# Patient Record
Sex: MALE | Race: WHITE | Employment: OTHER | URBAN - NONMETROPOLITAN AREA
[De-identification: names, ages, dates, MRNs, and addresses within clinical notes are randomized per-mention and may not be internally consistent; named-entity substitution may affect disease eponyms.]

---

## 2019-12-18 ENCOUNTER — OFFICE VISIT (OUTPATIENT)
Dept: CARDIOLOGY CLINIC | Age: 74
End: 2019-12-18
Payer: MEDICARE

## 2019-12-18 VITALS
BODY MASS INDEX: 32.95 KG/M2 | HEIGHT: 73 IN | DIASTOLIC BLOOD PRESSURE: 82 MMHG | SYSTOLIC BLOOD PRESSURE: 166 MMHG | HEART RATE: 84 BPM | WEIGHT: 248.6 LBS

## 2019-12-18 DIAGNOSIS — R06.00 DYSPNEA, UNSPECIFIED TYPE: ICD-10-CM

## 2019-12-18 DIAGNOSIS — I10 HYPERTENSION, UNSPECIFIED TYPE: Primary | ICD-10-CM

## 2019-12-18 DIAGNOSIS — R93.1 AGATSTON CORONARY ARTERY CALCIUM SCORE GREATER THAN 400: ICD-10-CM

## 2019-12-18 PROCEDURE — G8417 CALC BMI ABV UP PARAM F/U: HCPCS | Performed by: INTERNAL MEDICINE

## 2019-12-18 PROCEDURE — G8427 DOCREV CUR MEDS BY ELIG CLIN: HCPCS | Performed by: INTERNAL MEDICINE

## 2019-12-18 PROCEDURE — 4040F PNEUMOC VAC/ADMIN/RCVD: CPT | Performed by: INTERNAL MEDICINE

## 2019-12-18 PROCEDURE — 3017F COLORECTAL CA SCREEN DOC REV: CPT | Performed by: INTERNAL MEDICINE

## 2019-12-18 PROCEDURE — 1036F TOBACCO NON-USER: CPT | Performed by: INTERNAL MEDICINE

## 2019-12-18 PROCEDURE — 1123F ACP DISCUSS/DSCN MKR DOCD: CPT | Performed by: INTERNAL MEDICINE

## 2019-12-18 PROCEDURE — 99204 OFFICE O/P NEW MOD 45 MIN: CPT | Performed by: INTERNAL MEDICINE

## 2019-12-18 PROCEDURE — 93000 ELECTROCARDIOGRAM COMPLETE: CPT | Performed by: INTERNAL MEDICINE

## 2019-12-18 PROCEDURE — G8484 FLU IMMUNIZE NO ADMIN: HCPCS | Performed by: INTERNAL MEDICINE

## 2019-12-18 RX ORDER — ROSUVASTATIN CALCIUM 20 MG/1
20 TABLET, COATED ORAL DAILY
Qty: 30 TABLET | Refills: 3 | Status: SHIPPED | OUTPATIENT
Start: 2019-12-18 | End: 2020-06-25

## 2019-12-18 RX ORDER — MULTIVITAMIN WITH IRON
250 TABLET ORAL DAILY
COMMUNITY

## 2019-12-18 RX ORDER — METOPROLOL SUCCINATE 25 MG/1
25 TABLET, EXTENDED RELEASE ORAL DAILY
Qty: 30 TABLET | Refills: 3 | Status: SHIPPED | OUTPATIENT
Start: 2019-12-18 | End: 2020-05-20 | Stop reason: SDUPTHER

## 2020-01-22 ENCOUNTER — HOSPITAL ENCOUNTER (OUTPATIENT)
Dept: NON INVASIVE DIAGNOSTICS | Age: 75
Discharge: HOME | End: 2020-01-22
Payer: MEDICARE

## 2020-01-22 VITALS — WEIGHT: 249.5 LBS | HEIGHT: 73 IN | BODY MASS INDEX: 33.07 KG/M2

## 2020-01-22 LAB
LV EF: 63 %
LVEF MODALITY: NORMAL

## 2020-01-22 PROCEDURE — 3430000000 HC RX DIAGNOSTIC RADIOPHARMACEUTICAL: Performed by: INTERNAL MEDICINE

## 2020-01-22 PROCEDURE — A9500 TC99M SESTAMIBI: HCPCS | Performed by: INTERNAL MEDICINE

## 2020-01-22 PROCEDURE — 2709999900 HC NON-CHARGEABLE SUPPLY

## 2020-01-22 PROCEDURE — 78452 HT MUSCLE IMAGE SPECT MULT: CPT

## 2020-01-22 PROCEDURE — 93017 CV STRESS TEST TRACING ONLY: CPT | Performed by: INTERNAL MEDICINE

## 2020-01-22 RX ADMIN — Medication 10 MILLICURIE: at 07:50

## 2020-01-22 RX ADMIN — Medication 32.4 MILLICURIE: at 09:10

## 2020-01-24 ENCOUNTER — TELEPHONE (OUTPATIENT)
Dept: CARDIOLOGY CLINIC | Age: 75
End: 2020-01-24

## 2020-01-28 ENCOUNTER — TELEPHONE (OUTPATIENT)
Dept: CARDIOLOGY CLINIC | Age: 75
End: 2020-01-28

## 2020-02-05 ENCOUNTER — PREP FOR PROCEDURE (OUTPATIENT)
Dept: CARDIOLOGY CLINIC | Age: 75
End: 2020-02-05

## 2020-02-05 RX ORDER — SODIUM CHLORIDE 0.9 % (FLUSH) 0.9 %
10 SYRINGE (ML) INJECTION EVERY 12 HOURS SCHEDULED
Status: CANCELLED | OUTPATIENT
Start: 2020-02-05

## 2020-02-05 RX ORDER — SODIUM CHLORIDE 9 MG/ML
INJECTION, SOLUTION INTRAVENOUS CONTINUOUS
Status: CANCELLED | OUTPATIENT
Start: 2020-02-05

## 2020-02-05 RX ORDER — SODIUM CHLORIDE 0.9 % (FLUSH) 0.9 %
10 SYRINGE (ML) INJECTION PRN
Status: CANCELLED | OUTPATIENT
Start: 2020-02-05

## 2020-02-05 RX ORDER — DIPHENHYDRAMINE HYDROCHLORIDE 50 MG/ML
50 INJECTION INTRAMUSCULAR; INTRAVENOUS ONCE
Status: CANCELLED | OUTPATIENT
Start: 2020-02-05 | End: 2020-02-05

## 2020-02-05 RX ORDER — NITROGLYCERIN 0.4 MG/1
0.4 TABLET SUBLINGUAL EVERY 5 MIN PRN
Status: CANCELLED | OUTPATIENT
Start: 2020-02-05

## 2020-02-05 RX ORDER — ASPIRIN 325 MG
325 TABLET ORAL ONCE
Status: CANCELLED | OUTPATIENT
Start: 2020-02-05 | End: 2020-02-05

## 2020-02-06 ENCOUNTER — HOSPITAL ENCOUNTER (OUTPATIENT)
Dept: INPATIENT UNIT | Age: 75
Discharge: HOME | End: 2020-02-07
Attending: INTERNAL MEDICINE | Admitting: INTERNAL MEDICINE
Payer: MEDICARE

## 2020-02-06 PROBLEM — I25.10 CAD IN NATIVE ARTERY: Status: ACTIVE | Noted: 2020-02-06

## 2020-02-06 PROBLEM — Z98.61 CAD S/P PERCUTANEOUS CORONARY ANGIOPLASTY: Status: ACTIVE | Noted: 2020-02-06

## 2020-02-06 PROBLEM — I25.10 CAD S/P PERCUTANEOUS CORONARY ANGIOPLASTY: Status: ACTIVE | Noted: 2020-02-06

## 2020-02-06 LAB
ABO: NORMAL
ACTIVATED CLOTTING TIME: 296 SECONDS (ref 1–150)
ANION GAP SERPL CALCULATED.3IONS-SCNC: 15 MEQ/L (ref 8–16)
ANTIBODY SCREEN: NORMAL
APTT: 31 SECONDS (ref 22–38)
BUN BLDV-MCNC: 14 MG/DL (ref 7–22)
CALCIUM SERPL-MCNC: 9.8 MG/DL (ref 8.5–10.5)
CHLORIDE BLD-SCNC: 102 MEQ/L (ref 98–111)
CO2: 23 MEQ/L (ref 23–33)
CREAT SERPL-MCNC: 1 MG/DL (ref 0.4–1.2)
EKG ATRIAL RATE: 80 BPM
EKG P AXIS: 20 DEGREES
EKG P-R INTERVAL: 154 MS
EKG Q-T INTERVAL: 362 MS
EKG QRS DURATION: 90 MS
EKG QTC CALCULATION (BAZETT): 417 MS
EKG R AXIS: 58 DEGREES
EKG T AXIS: 34 DEGREES
EKG VENTRICULAR RATE: 80 BPM
ERYTHROCYTE [DISTWIDTH] IN BLOOD BY AUTOMATED COUNT: 12.6 % (ref 11.5–14.5)
ERYTHROCYTE [DISTWIDTH] IN BLOOD BY AUTOMATED COUNT: 45.1 FL (ref 35–45)
GFR SERPL CREATININE-BSD FRML MDRD: 73 ML/MIN/1.73M2
GLUCOSE BLD-MCNC: 153 MG/DL (ref 70–108)
HCT VFR BLD CALC: 50.2 % (ref 42–52)
HEMOGLOBIN: 17 GM/DL (ref 14–18)
INR BLD: 1.02 (ref 0.85–1.13)
MCH RBC QN AUTO: 32.9 PG (ref 26–33)
MCHC RBC AUTO-ENTMCNC: 33.9 GM/DL (ref 32.2–35.5)
MCV RBC AUTO: 97.1 FL (ref 80–94)
PLATELET # BLD: 221 THOU/MM3 (ref 130–400)
PMV BLD AUTO: 10.1 FL (ref 9.4–12.4)
POTASSIUM REFLEX MAGNESIUM: 4.1 MEQ/L (ref 3.5–5.2)
RBC # BLD: 5.17 MILL/MM3 (ref 4.7–6.1)
RH FACTOR: NORMAL
SODIUM BLD-SCNC: 140 MEQ/L (ref 135–145)
WBC # BLD: 8.8 THOU/MM3 (ref 4.8–10.8)

## 2020-02-06 PROCEDURE — 92978 ENDOLUMINL IVUS OCT C 1ST: CPT | Performed by: INTERNAL MEDICINE

## 2020-02-06 PROCEDURE — 2580000003 HC RX 258: Performed by: NURSE PRACTITIONER

## 2020-02-06 PROCEDURE — C9600 PERC DRUG-EL COR STENT SING: HCPCS | Performed by: INTERNAL MEDICINE

## 2020-02-06 PROCEDURE — 85347 COAGULATION TIME ACTIVATED: CPT

## 2020-02-06 PROCEDURE — 92929 PR PRQ TRLUML CORONARY STENT W/ANGIO ADDL ART/BRNCH: CPT | Performed by: INTERNAL MEDICINE

## 2020-02-06 PROCEDURE — 80048 BASIC METABOLIC PNL TOTAL CA: CPT

## 2020-02-06 PROCEDURE — C1725 CATH, TRANSLUMIN NON-LASER: HCPCS

## 2020-02-06 PROCEDURE — 93571 IV DOP VEL&/PRESS C FLO 1ST: CPT | Performed by: INTERNAL MEDICINE

## 2020-02-06 PROCEDURE — 2720000010 HC SURG SUPPLY STERILE

## 2020-02-06 PROCEDURE — 86900 BLOOD TYPING SEROLOGIC ABO: CPT

## 2020-02-06 PROCEDURE — 93454 CORONARY ARTERY ANGIO S&I: CPT | Performed by: INTERNAL MEDICINE

## 2020-02-06 PROCEDURE — 2500000003 HC RX 250 WO HCPCS

## 2020-02-06 PROCEDURE — 6370000000 HC RX 637 (ALT 250 FOR IP)

## 2020-02-06 PROCEDURE — C1769 GUIDE WIRE: HCPCS

## 2020-02-06 PROCEDURE — 85027 COMPLETE CBC AUTOMATED: CPT

## 2020-02-06 PROCEDURE — 86850 RBC ANTIBODY SCREEN: CPT

## 2020-02-06 PROCEDURE — 2580000003 HC RX 258: Performed by: INTERNAL MEDICINE

## 2020-02-06 PROCEDURE — 6360000004 HC RX CONTRAST MEDICATION: Performed by: INTERNAL MEDICINE

## 2020-02-06 PROCEDURE — 85610 PROTHROMBIN TIME: CPT

## 2020-02-06 PROCEDURE — 6370000000 HC RX 637 (ALT 250 FOR IP): Performed by: INTERNAL MEDICINE

## 2020-02-06 PROCEDURE — 92928 PRQ TCAT PLMT NTRAC ST 1 LES: CPT | Performed by: INTERNAL MEDICINE

## 2020-02-06 PROCEDURE — 36415 COLL VENOUS BLD VENIPUNCTURE: CPT

## 2020-02-06 PROCEDURE — 93005 ELECTROCARDIOGRAM TRACING: CPT | Performed by: NURSE PRACTITIONER

## 2020-02-06 PROCEDURE — C1894 INTRO/SHEATH, NON-LASER: HCPCS

## 2020-02-06 PROCEDURE — 6360000002 HC RX W HCPCS

## 2020-02-06 PROCEDURE — C1874 STENT, COATED/COV W/DEL SYS: HCPCS

## 2020-02-06 PROCEDURE — 2709999900 HC NON-CHARGEABLE SUPPLY

## 2020-02-06 PROCEDURE — 85730 THROMBOPLASTIN TIME PARTIAL: CPT

## 2020-02-06 PROCEDURE — 86901 BLOOD TYPING SEROLOGIC RH(D): CPT

## 2020-02-06 PROCEDURE — C9601 PERC DRUG-EL COR STENT BRAN: HCPCS | Performed by: INTERNAL MEDICINE

## 2020-02-06 PROCEDURE — 94761 N-INVAS EAR/PLS OXIMETRY MLT: CPT

## 2020-02-06 PROCEDURE — C1887 CATHETER, GUIDING: HCPCS

## 2020-02-06 RX ORDER — ACETAMINOPHEN 325 MG/1
650 TABLET ORAL EVERY 4 HOURS PRN
Status: DISCONTINUED | OUTPATIENT
Start: 2020-02-06 | End: 2020-02-07 | Stop reason: HOSPADM

## 2020-02-06 RX ORDER — ASPIRIN 81 MG/1
81 TABLET, CHEWABLE ORAL DAILY
Status: DISCONTINUED | OUTPATIENT
Start: 2020-02-07 | End: 2020-02-06 | Stop reason: SDUPTHER

## 2020-02-06 RX ORDER — DIPHENHYDRAMINE HYDROCHLORIDE 50 MG/ML
50 INJECTION INTRAMUSCULAR; INTRAVENOUS ONCE
Status: DISCONTINUED | OUTPATIENT
Start: 2020-02-06 | End: 2020-02-07 | Stop reason: HOSPADM

## 2020-02-06 RX ORDER — SODIUM CHLORIDE 0.9 % (FLUSH) 0.9 %
10 SYRINGE (ML) INJECTION PRN
Status: DISCONTINUED | OUTPATIENT
Start: 2020-02-06 | End: 2020-02-06 | Stop reason: SDUPTHER

## 2020-02-06 RX ORDER — METOPROLOL SUCCINATE 25 MG/1
25 TABLET, EXTENDED RELEASE ORAL DAILY
Status: DISCONTINUED | OUTPATIENT
Start: 2020-02-07 | End: 2020-02-07 | Stop reason: HOSPADM

## 2020-02-06 RX ORDER — NITROGLYCERIN 0.4 MG/1
0.4 TABLET SUBLINGUAL EVERY 5 MIN PRN
Status: DISCONTINUED | OUTPATIENT
Start: 2020-02-06 | End: 2020-02-07 | Stop reason: HOSPADM

## 2020-02-06 RX ORDER — MULTIVITAMIN/IRON/FOLIC ACID 18MG-0.4MG
250 TABLET ORAL DAILY
Status: DISCONTINUED | OUTPATIENT
Start: 2020-02-06 | End: 2020-02-07 | Stop reason: HOSPADM

## 2020-02-06 RX ORDER — SODIUM CHLORIDE 0.9 % (FLUSH) 0.9 %
10 SYRINGE (ML) INJECTION PRN
Status: DISCONTINUED | OUTPATIENT
Start: 2020-02-06 | End: 2020-02-07 | Stop reason: HOSPADM

## 2020-02-06 RX ORDER — ASPIRIN 81 MG/1
81 TABLET ORAL DAILY
Status: DISCONTINUED | OUTPATIENT
Start: 2020-02-07 | End: 2020-02-07 | Stop reason: HOSPADM

## 2020-02-06 RX ORDER — ASCORBIC ACID 500 MG
500 TABLET ORAL DAILY
Status: DISCONTINUED | OUTPATIENT
Start: 2020-02-06 | End: 2020-02-07 | Stop reason: HOSPADM

## 2020-02-06 RX ORDER — ROSUVASTATIN CALCIUM 20 MG/1
20 TABLET, COATED ORAL DAILY
Status: DISCONTINUED | OUTPATIENT
Start: 2020-02-06 | End: 2020-02-07 | Stop reason: HOSPADM

## 2020-02-06 RX ORDER — ASPIRIN 325 MG
325 TABLET ORAL ONCE
Status: DISCONTINUED | OUTPATIENT
Start: 2020-02-06 | End: 2020-02-06 | Stop reason: SDUPTHER

## 2020-02-06 RX ORDER — SODIUM CHLORIDE 9 MG/ML
INJECTION, SOLUTION INTRAVENOUS CONTINUOUS
Status: DISCONTINUED | OUTPATIENT
Start: 2020-02-06 | End: 2020-02-07 | Stop reason: HOSPADM

## 2020-02-06 RX ORDER — SODIUM CHLORIDE 0.9 % (FLUSH) 0.9 %
10 SYRINGE (ML) INJECTION EVERY 12 HOURS SCHEDULED
Status: DISCONTINUED | OUTPATIENT
Start: 2020-02-06 | End: 2020-02-06 | Stop reason: SDUPTHER

## 2020-02-06 RX ORDER — SODIUM CHLORIDE 0.9 % (FLUSH) 0.9 %
10 SYRINGE (ML) INJECTION EVERY 12 HOURS SCHEDULED
Status: DISCONTINUED | OUTPATIENT
Start: 2020-02-06 | End: 2020-02-07 | Stop reason: HOSPADM

## 2020-02-06 RX ADMIN — ROSUVASTATIN CALCIUM 20 MG: 20 TABLET, COATED ORAL at 20:54

## 2020-02-06 RX ADMIN — SODIUM CHLORIDE: 9 INJECTION, SOLUTION INTRAVENOUS at 08:39

## 2020-02-06 RX ADMIN — TICAGRELOR 90 MG: 90 TABLET ORAL at 20:58

## 2020-02-06 RX ADMIN — IOPAMIDOL 200 ML: 755 INJECTION, SOLUTION INTRAVENOUS at 12:15

## 2020-02-06 RX ADMIN — SODIUM CHLORIDE, PRESERVATIVE FREE 10 ML: 5 INJECTION INTRAVENOUS at 20:58

## 2020-02-06 ASSESSMENT — PAIN SCALES - GENERAL: PAINLEVEL_OUTOF10: 0

## 2020-02-06 NOTE — FLOWSHEET NOTE
Pt admitted to  2E15 ambulatory for cardiac cath. Pt NPO. Patient accompanied by wife. Vital signs obtained. Assessment and data collection initiated. Oriented to room. Policies and procedures for 2E explained   All questions answered with no further questions at this time. Fall prevention and safety precautions discussed with patient.

## 2020-02-06 NOTE — BRIEF OP NOTE
[address] [address]  Sedation/Analgesia Post Sedation Record    Pt Name: Oumou Garay  Account number: [de-identified]  MRN: [id number]  YOB: 1945  Procedure Performed By: Berry Dougherty, [address]  Primary Care Physician: Alonso Ovalles APRN - CNP  Date: 2/6/2020    POST-PROCEDURE    Physicians/Assistants: RAFFY Costello MD    Procedure Performed:Cath/ PCI      Sedation/Anesthesia: Versed/ Fentanyl and 2% xylocaine local anesthesia. Estimated Blood Loss: < 50 ml. Specimens Removed: None         Disposition of Specimen: N/A        Complications: No Immediate Complications.        Post-procedure Diagnosis/Findings:      Bifurcation LAD-Dx PCI  DAPT              RAFFY Costello MD  Electronically signed 2/6/2020 at 12:04 PM  Interventional Cardiology

## 2020-02-06 NOTE — PRE SEDATION
[address] [address]  Sedation/Analgesia History & Physical    Pt Name: Penney Saint  Account number: [de-identified]  MRN: [id number]  YOB: 1945  Provider Performing Procedure: Mady Jacques MD  Referring Provider: Mady Jacques MD   Primary Care Physician: BEAR Whalen - CNP  Date: 2/6/2020    PRE-PROCEDURE    Code Status: FULL CODE  Brief History/Pre-Procedure Diagnosis:   + stress  KEY  Elevated calcium score     Consent: : I have discussed with the patient risks, benefits, and alternatives (and relevant risks, benefits, and side effects related to alternatives or not receiving care), and likelihood of the success. The patient and/or representative appear to understand and agree to proceed. The discussion encompasses risks, benefits, and side effects related to the alternatives and the risks related to not receiving the proposed care, treatment, and services. The indication, risks and benefits of the procedure and possible therapeutic consequences and alternatives were discussed with the patient. The patient was given the opportunity to ask questions and to have them answered to his/her satisfaction. Risks of the procedure include but are not limited to the following: Bleeding, hematoma including retroperitoneal hemmorhage, infection, pain and discomfort, injury to the aorta and other blood vessels, rhythm disturbance, low blood pressure, myocardial infarction, stroke, kidney damage/failure, myocardial perforation, allergic reactions to sedatives/contrast material, loss of pulse/vascular compromise requiring surgery, aneurysm/pseudoaneurysm formation, possible loss of a limb/hand/leg, needing blood transfusion, requiring emergent open heart surgery or emergent coronary intervention, the need for intubation/respiratory support, the requirement for defibrillation/cardioversion, and death. Alternatives to and omission of the suggested procedure were discussed.  The patient had no further questions and wished to proceed; the consent form was signed. MEDICAL HISTORY  [x]ASHD/ANGINA/MI/CHF   [x]Hypertension  []Diabetes  [x]Hyperlipidemia  []Smoking  []Family Hx of ASHD  [x]Additional information:       has a past medical history of Cancer ([facility] [address] History of kidney stones, Hyperlipidemia, and Hypertension. SURGICAL HISTORY   has no past surgical history on file. Additional information:       ALLERGIES   Allergies as of 02/06/2020    (No Known Allergies)     Additional information:       MEDICATIONS   Aspirin  [x] 81 mg  [] 325 mg  [] None  Antiplatelet drug therapy use last 5 days  [x]No []Yes  Coumadin Use Last 5 Days [x]No []Yes  Other anticoagulant use last 5 days  [x]No []Yes    Current Facility-Administered Medications:     0.9 % sodium chloride infusion, , Intravenous, Continuous, Lazarus Knoll, APRN - CNP, Last Rate: 75 mL/hr at 02/06/20 7955    aspirin tablet 325 mg, 325 mg, Oral, Once, Lazarus Knoll, APRN - CNP    diphenhydrAMINE (BENADRYL) injection 50 mg, 50 mg, Intravenous, Once, Lazarus Knoll, APRN - CNP    famotidine (PEPCID) injection 20 mg, 20 mg, Intravenous, Once, Lazarus Knoll, APRN - CNP    hydrocortisone sodium succinate PF (SOLU-CORTEF) injection 200 mg, 200 mg, Intravenous, Once, Lazarus Knoll, APRN - CNP    nitroGLYCERIN (NITROSTAT) SL tablet 0.4 mg, 0.4 mg, Sublingual, Q5 Min PRN, Lazarus Knoll, APRN - CNP    sodium chloride flush 0.9 % injection 10 mL, 10 mL, Intravenous, 2 times per day, Lazarus Knoll, APRN - CNP    sodium chloride flush 0.9 % injection 10 mL, 10 mL, Intravenous, PRN, Lazarus Knoll, APRN - CNP  Prior to Admission medications    Medication Sig Start Date End Date Taking?  Authorizing Provider   Multiple Vitamins-Minerals (PRESERVISION AREDS 2 PO) Take 1 tablet by mouth daily    Yes Historical Provider, MD   magnesium (MAGNESIUM-OXIDE) 250 MG TABS tablet Take 250 mg by mouth daily   Yes Historical Provider, MD metoprolol succinate (TOPROL XL) 25 MG extended release tablet Take 1 tablet by mouth daily 12/18/19  Yes Marguerite Boss MD   rosuvastatin (CRESTOR) 20 MG tablet Take 1 tablet by mouth daily 12/18/19  Yes Marguerite Boss MD   lisinopril (PRINIVIL;ZESTRIL) 40 MG tablet New increased dose one daily 6/25/15  Yes Chris Valle MD   KRILL OIL PO Take  by mouth daily. Yes Historical Provider, MD   Cholecalciferol (VITAMIN D) 2000 UNITS CAPS capsule Take  by mouth daily. Yes Historical Provider, MD   ascorbic acid (VITAMIN C) 500 MG tablet Take 500 mg by mouth daily. Yes Historical Provider, MD   Coenzyme Q10 (CO Q 10 PO) Take  by mouth daily. Yes Historical Provider, MD   aspirin EC 81 MG EC tablet Take 81 mg by mouth daily. Yes Historical Provider, MD   UNABLE TO FIND Neuro B6 daily   Yes Historical Provider, MD   UNABLE TO FIND Super Beta Prostate daily   Yes Historical Provider, MD     Additional information:       VITAL SIGNS   There were no vitals filed for this visit. PHYSICAL:   General: No acute distress  HEENT:  Unremarkable for age  Neck: without increased JVD, carotid pulses 2+ bilaterally without bruits  Heart: RRR, S1 & S2 WNL, S4 gallop, without murmurs or rubs    Lungs: Clear to auscultation    Abdomen: BS present, without HSM, masses, or tenderness    Extremities: without C,C,E.  Pulses 2+ bilaterally  Mental Status: Alert & Oriented        PLANNED PROCEDURE   [x]Cath  [x]PCI                []Pacemaker/AICD  []CARLITA             []Cardioversion []Peripheral angiography/PTA  []Other:      SEDATION  Planned agent:[x]Midazolam []Meperidine [x]Sublimaze []Morphine  []Diazepam  []Other:     ASA Classification:  []1 []2 [x]3 []4 []5  Class 1: A normal healthy patient  Class 2: Pt with mild to moderate systemic disease  Class 3: Severe systemic disease or disturbance  Class 4: Severe systemic disorders that are already life threatening.   Class 5: Moribund pt with little chances of survival, for

## 2020-02-07 VITALS
HEART RATE: 77 BPM | RESPIRATION RATE: 16 BRPM | SYSTOLIC BLOOD PRESSURE: 121 MMHG | HEIGHT: 73 IN | WEIGHT: 251.7 LBS | TEMPERATURE: 98.1 F | DIASTOLIC BLOOD PRESSURE: 77 MMHG | OXYGEN SATURATION: 93 % | BODY MASS INDEX: 33.36 KG/M2

## 2020-02-07 PROCEDURE — 2580000003 HC RX 258: Performed by: INTERNAL MEDICINE

## 2020-02-07 PROCEDURE — 99238 HOSP IP/OBS DSCHRG MGMT 30/<: CPT | Performed by: NURSE PRACTITIONER

## 2020-02-07 PROCEDURE — 6370000000 HC RX 637 (ALT 250 FOR IP): Performed by: INTERNAL MEDICINE

## 2020-02-07 PROCEDURE — 96361 HYDRATE IV INFUSION ADD-ON: CPT

## 2020-02-07 RX ORDER — LISINOPRIL 40 MG/1
40 TABLET ORAL DAILY
Status: DISCONTINUED | OUTPATIENT
Start: 2020-02-07 | End: 2020-02-07 | Stop reason: HOSPADM

## 2020-02-07 RX ORDER — NITROGLYCERIN 0.4 MG/1
TABLET SUBLINGUAL
Qty: 25 TABLET | Refills: 3 | Status: SHIPPED | OUTPATIENT
Start: 2020-02-07 | End: 2021-06-25 | Stop reason: SDUPTHER

## 2020-02-07 RX ADMIN — TICAGRELOR 90 MG: 90 TABLET ORAL at 08:32

## 2020-02-07 RX ADMIN — SODIUM CHLORIDE, PRESERVATIVE FREE 10 ML: 5 INJECTION INTRAVENOUS at 08:30

## 2020-02-07 RX ADMIN — METOPROLOL SUCCINATE 25 MG: 25 TABLET, EXTENDED RELEASE ORAL at 08:33

## 2020-02-07 RX ADMIN — ROSUVASTATIN CALCIUM 20 MG: 20 TABLET, COATED ORAL at 08:34

## 2020-02-07 RX ADMIN — ASPIRIN 81 MG: 81 TABLET ORAL at 08:32

## 2020-02-07 ASSESSMENT — PAIN SCALES - GENERAL: PAINLEVEL_OUTOF10: 0

## 2020-02-07 NOTE — DISCHARGE SUMMARY
Cardiology Discharge Summary      Patient Identification:  Demarco Friend  :   MRN: [id number]   Account: [de-identified]     Admit date: 2020  Discharge date: 20     Attending provider: Dave Pearson MD        Primary care provider: BEAR Mauricio CNP     Admission Diagnoses:  Dyspnea on exertion  Premature Family CAD/Death  Agaston calcium score 735  Abnormal stress test  HTN  HLD      Discharge Diagnoses:   Dyspnea on exertion  Premature Family CAD/Death  Agaston calcium score 735  Abnormal stress test  HTN  HLD  LHC with PCI / BHARGAV of LAD bifurcation     Hospital Course:   Demarco Friend is a 75 y.o. male with the above past medical history admitted to [address] [place] [address] on 2020 for LHC by Dr. Benard Schilder to further evaluate coronary artery anatomy in light of dyspnea on exertion, abnormal calcium score, and abnormal stress test. LHC revealed LAD bifurcation obstruction necessitating PCI / BHARGAV of area. He convalesced over night without adverse events and on 20 was hemodynamically stable and agreeable to discharge home. At the time of discharge he was pain free,  tolerating food and fluids, ambulating without difficulty or complaints of. Right radial cath site soft without pain, bleeding, drainage, redness, or warmth. Right upper extremitie with normal color / temperature, evident movement / sensation, and pulses present. The patient has been educated on symptoms of heart disease that include chest pain, passing out, dizziness, etc. And to report them if there is any change or go to the emergency room. He will follow up in cardiology clinic in 1-2 weeks.      Procedures: LHC with PCI / BHARGAV of LAD bifurcation    Code Status: Full Code     Consults:   none    Examination:  Vitals:/77   Pulse 77   Temp 98.1 °F (36.7 °C) (Oral)   Resp 16   Ht 6' 1\" (1.854 m)   Wt 251 lb 11.2 oz (114.2 kg)   SpO2 93%   BMI 33.21 kg/m² 24 hour intake/output:    Intake/Output Summary (Last 24 hours) at 2/7/2020 0957  Last data filed at 2/7/2020 0341  Gross per 24 hour   Intake 150 ml   Output 0 ml   Net 150 ml       General Appearance: alert and oriented to person, place and time, in no acute distress  Cardiovascular: normal rate, regular rhythm, normal S1 and S2, no murmurs, rubs, clicks, or gallops, distal pulses intact, no carotid bruits, no JVD  Pulmonary/Chest: clear to auscultation bilaterally- no wheezes, rales or rhonchi, normal air movement, no respiratory distress  Abdomen: soft, non-tender, non-distended, normal bowel sounds, no masses Extremities: no cyanosis, clubbing or edema, pulse   Right radial cath site: soft without pain, bleeding, drainage, redness, or warmth. Extremity with normal color / temperature, evident movement / sensation, and pulses present. Skin: warm and dry, no rash or erythema  Head: normocephalic and atraumatic  Eyes: pupils equal, round, and reactive to light  Neck: supple and non-tender without mass, no thyromegaly   Musculoskeletal: normal range of motion, no joint swelling, deformity or tenderness  Neurological: alert, oriented, normal speech, no focal findings or movement disorder noted    Medications:  Current Discharge Medication List      START taking these medications    Details   ticagrelor (BRILINTA) 90 MG TABS tablet Take 1 tablet by mouth 2 times daily  Qty: 180 tablet, Refills: 3      nitroGLYCERIN (NITROSTAT) 0.4 MG SL tablet up to max of 3 total doses. If no relief after 1 dose, call 911.   Qty: 25 tablet, Refills: 3         CONTINUE these medications which have NOT CHANGED    Details   Multiple Vitamins-Minerals (PRESERVISION AREDS 2 PO) Take 1 tablet by mouth daily       magnesium (MAGNESIUM-OXIDE) 250 MG TABS tablet Take 250 mg by mouth daily      metoprolol succinate (TOPROL XL) 25 MG extended release tablet Take 1 tablet by mouth daily  Qty: 30 tablet, Refills: 3      rosuvastatin

## 2020-02-10 NOTE — PROCEDURES
OM1  without any significant obstruction. There is a large dominant OM2, has  no significant obstruction. The AV groove branch has had moderate  luminal irregularities with no significant obstruction. INTERVENTION:  Given the findings and presentation, we elected to  proceed with FFR of the LAD. I exchanged out for a 6-[place]ij[place] EBU 3.75  guiding catheter. Heparin IV was given. ACT was confirmed to be above  250 seconds. I then appropriately zeroed and equalized the FFR wire per   recommendations. I passed the FFR wire down into the  distal LAD. IC nitroglycerin was given. IV adenosine was infused. After 3 minutes of IV adenosine, the FFR value was 0.84. At this point  the only residual major lesion was in the diagonal branch. We took the  FFR wire back, re-wired the diagonal branch for intervention. I then  passed 2.5 x 23 Xience [facility] and was going to deploy this at the ostium of  the diagonal branch to the mid lesion. I did wire the LAD with a  Runthrough wire for protection just in case there was severe narrowing. After this was done, I deployed the stent in the diagonal branch. I  postdilated the stent up to 12 atmospheres. Once this was done, I  wanted to check the LAD to ensure that there was no plaque shift into the LAD  that would result in significant stenosis or ischemia. Given these  findings, I pulled the wire out of the diagonal branch where I put the  FFR wire back into the LAD. Since, it was never removed from the body,  IV adenosine was then re-infused. After about a minute to 2 minutes of  IV adenosine infusion, the FFR value was 0.75. Clearly, there was  plaque shift into the LAD and that would cause significant ischemia, but  in an effort to save the diagonal branch, we would have to ensure that  the LAD was appropriately patent. I removed the wire from the diagonal  branch.  I got a 3.0 x 8 NC balloon and dilated the mid LAD, crossing the  stent in the

## 2020-02-12 ENCOUNTER — HOSPITAL ENCOUNTER (OUTPATIENT)
Dept: NON INVASIVE DIAGNOSTICS | Age: 75
Discharge: HOME | End: 2020-02-12
Payer: MEDICARE

## 2020-02-12 LAB
LV EF: 63 %
LVEF MODALITY: NORMAL

## 2020-02-12 PROCEDURE — 93306 TTE W/DOPPLER COMPLETE: CPT

## 2020-02-20 ENCOUNTER — OFFICE VISIT (OUTPATIENT)
Dept: CARDIOLOGY CLINIC | Age: 75
End: 2020-02-20
Payer: MEDICARE

## 2020-02-20 VITALS
DIASTOLIC BLOOD PRESSURE: 82 MMHG | HEIGHT: 73 IN | WEIGHT: 252.4 LBS | SYSTOLIC BLOOD PRESSURE: 150 MMHG | HEART RATE: 69 BPM | BODY MASS INDEX: 33.45 KG/M2

## 2020-02-20 PROCEDURE — G8417 CALC BMI ABV UP PARAM F/U: HCPCS | Performed by: NURSE PRACTITIONER

## 2020-02-20 PROCEDURE — G8484 FLU IMMUNIZE NO ADMIN: HCPCS | Performed by: NURSE PRACTITIONER

## 2020-02-20 PROCEDURE — 3017F COLORECTAL CA SCREEN DOC REV: CPT | Performed by: NURSE PRACTITIONER

## 2020-02-20 PROCEDURE — 4040F PNEUMOC VAC/ADMIN/RCVD: CPT | Performed by: NURSE PRACTITIONER

## 2020-02-20 PROCEDURE — 99213 OFFICE O/P EST LOW 20 MIN: CPT | Performed by: NURSE PRACTITIONER

## 2020-02-20 PROCEDURE — 1036F TOBACCO NON-USER: CPT | Performed by: NURSE PRACTITIONER

## 2020-02-20 PROCEDURE — 1123F ACP DISCUSS/DSCN MKR DOCD: CPT | Performed by: NURSE PRACTITIONER

## 2020-02-20 PROCEDURE — 93000 ELECTROCARDIOGRAM COMPLETE: CPT | Performed by: NURSE PRACTITIONER

## 2020-02-20 PROCEDURE — G8427 DOCREV CUR MEDS BY ELIG CLIN: HCPCS | Performed by: NURSE PRACTITIONER

## 2020-02-20 NOTE — PROGRESS NOTES
[facility] PROF[facility]  [facility]   [address] [facility] [address]   Dept: [phone]   Dept Fax: [phone]: [phone]      Chief Complaint   Patient presents with    Follow-Up from Hospital     s/p cath, with stents     Follow up from [facility] with OCT guidance PCI/BHARGAV of LAD-diagonal bifurcation. Denies chest pain, palpitations, sob, THIERNO, lightheadedness, dizziness or syncope. No bleeding issues with DAPT. EF 60. B/P in office elevated, B/P at home has been 120-130 / 70's, he states he has white coat syndrome and this always happens at Dr mederos. Cardiologist:  Dr. Larios Hence:   No fever, no chills, No fatigue or weight loss  Pulmonary:    No dyspnea, no wheezing  Cardiac:    Denies recent chest pain   GI:     No nausea or vomiting, no abdominal pain  Neuro:    No dizziness or light headedness  Musculoskeletal:  No recent active issues  Extremities:   No edema, good peripheral pulses      Past Medical History:   Diagnosis Date    Cancer ([address]     skin cancer    History of kidney stones     Hyperlipidemia     Hypertension        No Known Allergies    Current Outpatient Medications   Medication Sig Dispense Refill    ticagrelor (BRILINTA) 90 MG TABS tablet Take 1 tablet by mouth 2 times daily 180 tablet 3    nitroGLYCERIN (NITROSTAT) 0.4 MG SL tablet up to max of 3 total doses. If no relief after 1 dose, call 911. 25 tablet 3    Multiple Vitamins-Minerals (PRESERVISION AREDS 2 PO) Take 1 tablet by mouth daily       magnesium (MAGNESIUM-OXIDE) 250 MG TABS tablet Take 250 mg by mouth daily      metoprolol succinate (TOPROL XL) 25 MG extended release tablet Take 1 tablet by mouth daily 30 tablet 3    rosuvastatin (CRESTOR) 20 MG tablet Take 1 tablet by mouth daily 30 tablet 3    lisinopril (PRINIVIL;ZESTRIL) 40 MG tablet New increased dose one daily 90 tablet 3    KRILL OIL PO Take  by mouth daily.       Cholecalciferol (VITAMIN D) 2000 UNITS CAPS capsule Take  by mouth daily.  ascorbic acid (VITAMIN C) 500 MG tablet Take 500 mg by mouth daily.  Coenzyme Q10 (CO Q 10 PO) Take  by mouth daily.  aspirin EC 81 MG EC tablet Take 81 mg by mouth daily.  UNABLE TO FIND Neuro B6 daily      UNABLE TO FIND Super Beta Prostate daily       No current facility-administered medications for this visit. Social History     Socioeconomic History    Marital status:      Spouse name: None    Number of children: None    Years of education: None    Highest education level: None   Occupational History    None   Social Needs    Financial resource strain: None    Food insecurity:     Worry: None     Inability: None    Transportation needs:     Medical: None     Non-medical: None   Tobacco Use    Smoking status: Former Smoker     Types: Cigarettes     Last attempt to quit:      Years since quittin.1    Smokeless tobacco: Never Used   Substance and Sexual Activity    Alcohol use: Yes     Alcohol/week: 0.0 standard drinks     Comment: socially    Drug use: No    Sexual activity: Yes   Lifestyle    Physical activity:     Days per week: None     Minutes per session: None    Stress: None   Relationships    Social connections:     Talks on phone: None     Gets together: None     Attends Mandaeism service: None     Active member of club or organization: None     Attends meetings of clubs or organizations: None     Relationship status: None    Intimate partner violence:     Fear of current or ex partner: None     Emotionally abused: None     Physically abused: None     Forced sexual activity: None   Other Topics Concern    None   Social History Narrative    None       Family History   Problem Relation Age of Onset    Diabetes Mother     Heart Disease Father     Heart Disease Brother        Blood pressure (!) 150/82, pulse 69, height 6' 1\" (1.854 m), weight 252 lb 6.4 oz (114.5 kg).     General:   Well developed, well nourished  Lungs:   Clear to auscultation  Heart:    Normal S1 S2, No murmur, rubs, or gallops  Abdomen:   Soft, non tender, no organomegalies, positive bowel sounds  Extremities:   No edema, no cyanosis, good peripheral pulses  Neurological:   Awake, alert, oriented. No obvious focal deficits  Musculoskeletal:  No obvious deformities    EKG: SR, no acute abnormalities    Echo: 2/12/20  Summary   Ejection fraction was estimated at 60-65%. Ascending aorta = 3.0 cm. IVC size is within normal limits with normal respiratory phasic changes. Signature      ----------------------------------------------------------------   Electronically signed by Rody Camp MD     LHC/PCI: 2/6/20  CORONARY ANGIOGRAM:     LEFT MAIN:  Patent without any significant obstruction.     LAD:  He has [id number] 50% to 60% stenosis, and about 40% to 50%  stenosis in the mid LAD and takeoff of large diagonal branch. Diagonal  has ostial 80% to 90% stenosis followed by another 70% to 80% stenosis  proximally. The diagonal branch was large, was at least 2.5 mm in  diameter.     RCA:  Moderate luminal irregularities throughout with maximum stenosis  of about 30% to 40% in the mid segment, bifurcates into the PDA and PLV  with mild luminal irregularities throughout. RCA is dominant.     LCX:  Mild luminal irregularities throughout. Gives rise to small OM1  without any significant obstruction. There is a large dominant OM2, has  no significant obstruction. The AV groove branch has had moderate  luminal irregularities with no significant obstruction.     INTERVENTION:  Given the findings and presentation, we elected to  proceed with FFR of the LAD. I exchanged out for a 6-[place] EBU 3.75  guiding catheter. Heparin IV was given. ACT was confirmed to be above  250 seconds. I then appropriately zeroed and equalized the FFR wire per   recommendations. I passed the FFR wire down into the  distal LAD.   IC

## 2020-04-02 ENCOUNTER — HOSPITAL ENCOUNTER (OUTPATIENT)
Dept: CARDIAC REHAB | Age: 75
Discharge: HOME | End: 2020-04-02

## 2020-05-21 RX ORDER — METOPROLOL SUCCINATE 25 MG/1
25 TABLET, EXTENDED RELEASE ORAL DAILY
Qty: 90 TABLET | Refills: 3 | Status: SHIPPED | OUTPATIENT
Start: 2020-05-21 | End: 2021-08-09

## 2020-06-25 ENCOUNTER — OFFICE VISIT (OUTPATIENT)
Dept: CARDIOLOGY CLINIC | Age: 75
End: 2020-06-25
Payer: MEDICARE

## 2020-06-25 VITALS
HEART RATE: 74 BPM | WEIGHT: 238.2 LBS | HEIGHT: 73 IN | SYSTOLIC BLOOD PRESSURE: 140 MMHG | BODY MASS INDEX: 31.57 KG/M2 | DIASTOLIC BLOOD PRESSURE: 84 MMHG

## 2020-06-25 PROCEDURE — 1123F ACP DISCUSS/DSCN MKR DOCD: CPT | Performed by: INTERNAL MEDICINE

## 2020-06-25 PROCEDURE — 3017F COLORECTAL CA SCREEN DOC REV: CPT | Performed by: INTERNAL MEDICINE

## 2020-06-25 PROCEDURE — 99214 OFFICE O/P EST MOD 30 MIN: CPT | Performed by: INTERNAL MEDICINE

## 2020-06-25 PROCEDURE — 1036F TOBACCO NON-USER: CPT | Performed by: INTERNAL MEDICINE

## 2020-06-25 PROCEDURE — 4040F PNEUMOC VAC/ADMIN/RCVD: CPT | Performed by: INTERNAL MEDICINE

## 2020-06-25 PROCEDURE — G8417 CALC BMI ABV UP PARAM F/U: HCPCS | Performed by: INTERNAL MEDICINE

## 2020-06-25 PROCEDURE — G8427 DOCREV CUR MEDS BY ELIG CLIN: HCPCS | Performed by: INTERNAL MEDICINE

## 2020-06-25 RX ORDER — ICOSAPENT ETHYL 1000 MG/1
2 CAPSULE ORAL 2 TIMES DAILY
Qty: 60 CAPSULE | Refills: 3 | Status: SHIPPED | OUTPATIENT
Start: 2020-06-25 | End: 2021-07-20

## 2020-06-25 RX ORDER — ROSUVASTATIN CALCIUM 40 MG/1
40 TABLET, COATED ORAL DAILY
Qty: 60 TABLET | Refills: 3 | Status: SHIPPED | OUTPATIENT
Start: 2020-06-25

## 2020-06-25 NOTE — PROGRESS NOTES
[facility] [address] [place] [address]  Dept: [phone]  Dept Fax: [phone]  Loc: [phone]    Visit Date: 6/25/2020    Mr. Motta is a 75 y.o. male  who presented for:  Chief Complaint   Patient presents with    Follow-up       HPI:   HPI   76 yo M hx of LAD-Dx PCI 2/2020, EF 60-65% who presents for follow-up. DAPT without bleeding. No NTG SL prn.  BP is mildly elevated, he says at home 118/64. No chest pain, angina, KEY, orthopnea, PND, sob at rest, palpitations, LE edema, or syncope. No issues with ADLs. Current Outpatient Medications:     metoprolol succinate (TOPROL XL) 25 MG extended release tablet, Take 1 tablet by mouth daily, Disp: 90 tablet, Rfl: 3    ticagrelor (BRILINTA) 90 MG TABS tablet, Take 1 tablet by mouth 2 times daily, Disp: 180 tablet, Rfl: 3    nitroGLYCERIN (NITROSTAT) 0.4 MG SL tablet, up to max of 3 total doses. If no relief after 1 dose, call 911., Disp: 25 tablet, Rfl: 3    Multiple Vitamins-Minerals (PRESERVISION AREDS 2 PO), Take 1 tablet by mouth daily , Disp: , Rfl:     magnesium (MAGNESIUM-OXIDE) 250 MG TABS tablet, Take 250 mg by mouth daily, Disp: , Rfl:     rosuvastatin (CRESTOR) 20 MG tablet, Take 1 tablet by mouth daily, Disp: 30 tablet, Rfl: 3    lisinopril (PRINIVIL;ZESTRIL) 40 MG tablet, New increased dose one daily, Disp: 90 tablet, Rfl: 3    KRILL OIL PO, Take  by mouth daily. , Disp: , Rfl:     Cholecalciferol (VITAMIN D) 2000 UNITS CAPS capsule, Take  by mouth daily. , Disp: , Rfl:     ascorbic acid (VITAMIN C) 500 MG tablet, Take 500 mg by mouth daily. , Disp: , Rfl:     Coenzyme Q10 (CO Q 10 PO), Take  by mouth daily. , Disp: , Rfl:     aspirin EC 81 MG EC tablet, Take 81 mg by mouth daily. , Disp: , Rfl:     UNABLE TO FIND, Neuro B6 daily, Disp: , Rfl:     UNABLE TO FIND, Super Beta Prostate daily, Disp: , Rfl:     Past Medical History  Shawn Pontiff  has a [facility] history [facility] History of kidney stones, Hyperlipidemia, and Hypertension. Social History  Alecia Orellana  reports that he quit smoking about 25 years ago. His smoking use included cigarettes. He has never used smokeless tobacco. He reports current alcohol use. He reports that he does not use drugs. Family History  Alecia Orellana family history includes Diabetes in his mother; Heart Disease in his brother and father. There is no family history of bicuspid aortic valve, aneurysms, heart transplant, pacemakers, defibrillators, or sudden cardiac death. Past Surgical History   History reviewed. No pertinent surgical history. Review of Systems   Constitutional: Negative for chills and fever  HENT: Negative for congestion, sinus pressure, sneezing and sore throat. Eyes: Negative for pain, discharge, redness and itching. Respiratory: Negative for apnea, cough  Gastrointestinal: Negative for blood in stool, constipation, diarrhea   Endocrine: Negative for cold intolerance, heat intolerance, polydipsia. Genitourinary: Negative for dysuria, enuresis, flank pain and hematuria. Musculoskeletal: Negative for arthralgias, joint swelling and neck pain. Neurological: Negative for numbness and headaches. Psychiatric/Behavioral: Negative for agitation, confusion, decreased concentration and dysphoric mood. Objective:     BP (!) 140/84   Pulse 74   Ht 6' 1\" (1.854 m)   Wt 238 lb 3.2 oz (108 kg)   BMI 31.43 kg/m²     Wt Readings from Last 3 Encounters:   06/25/20 238 lb 3.2 oz (108 kg)   02/20/20 252 lb 6.4 oz (114.5 kg)   02/07/20 251 lb 11.2 oz (114.2 kg)     BP Readings from Last 3 Encounters:   06/25/20 (!) 140/84   02/20/20 (!) 150/82   02/07/20 121/77       Nursing note and vitals reviewed. Physical Exam   Constitutional: Oriented to person, place, and time. Appears well-developed and well-nourished. HENT:   Head: Normocephalic and atraumatic.    Eyes: EOM are normal. Pupils are equal,                                      Ethnicity      Account #       [de-identified]      Room Number      Accession       [id number]      Date of Study         02/12/2020   Number      Date of Birth   1945     Referring Physician   Talha Campbell CNP      Age             75 year(s)     Bre Moulton MD                                  Physician     Procedure    Type of Study      TTE procedure:ECHOCARDIOGRAM COMPLETE 2D W DOPPLER W COLOR. Procedure Date  Date: 02/12/2020 Start: 09:54 AM    Study Location: Echo Lab  Technical Quality: Limited visualization due to poor acoustical window. Indications:S/P percutaneous stent placement. Additional Medical History:Sleep apnea, shortness of breath, CAD with recent  stents, abnormal stress, family history of CAD    Patient Status: Routine    Height: 73 inches Weight: 251.01 pounds BSA: 2.37 m^2 BMI: 33.12 kg/m^2    BP: 121/77 mmHg    Allergies    - No Known Allergies. Conclusions      Summary   Ejection fraction was estimated at 60-65%. Ascending aorta = 3.0 cm. IVC size is within normal limits with normal respiratory phasic changes. Signature      ----------------------------------------------------------------   Electronically signed by Dalton Jauregui MD (Interpreting   physician) on 02/13/2020 at 03:24 PM   ----------------------------------------------------------------      Findings      Mitral Valve   The mitral valve structure was normal with normal leaflet separation. DOPPLER: The transmitral velocity was within the normal range with no   evidence for mitral stenosis. There was no evidence of mitral   regurgitation. Aortic Valve   The aortic valve was trileaflet with normal thickness and cuspal   separation.  DOPPLER: Transaortic velocity was within the normal range with   no evidence of aortic stenosis. There was no evidence of aortic   regurgitation. Tricuspid Valve   The tricuspid valve structure was normal with normal leaflet separation. DOPPLER: There was no evidence of tricuspid stenosis. There was no   evidence of tricuspid regurgitation. Pulmonic Valve   The pulmonic valve leaflets were not well seen. DOPPLER: The transpulmonic   velocity was within the normal range with no evidence for regurgitation. Left Atrium   Left atrial size was normal.      Left Ventricle   Normal left ventricular size and systolic function. There were no regional wall motion abnormalities. Wall thickness was within normal limits. Ejection fraction was estimated at 60-65%. E/A flow reversal noted. Suggestive of diastolic dysfunction. Right Atrium   Right atrial size was normal.      Right Ventricle   The right ventricular size was normal with normal systolic function and   wall thickness. Pericardial Effusion   The pericardium was normal in appearance with no evidence of a pericardial   effusion. Pleural Effusion   No evidence of pleural effusion. Aorta / Great Vessels   -Ascending aorta = 3.0 cm. -IVC size is within normal limits with normal respiratory phasic changes.      M-Mode/2D Measurements & Calculations      LV Diastolic   LV Systolic Dimension:    AV Cusp Separation: 1.8 cmLA   Dimension: 4.4 3.1 cm                    Dimension: 3.8 cmAO Root   cm             LV Volume Diastolic: 90.7 Dimension: 3.3 cmLA Area: 17.7   LV FS:29.6 %   ml                        cm^2   LV PW          LV Volume Systolic: 68.7   Diastolic: 1   ml   cm             LV EDV/LV EDV Index: 87.7   Septum         ml/37 m^2LV ESV/LV ESV    RV Diastolic Dimension: 2.3 cm   Diastolic: 0.7 Index: 19.4 ml/16 m^2   cm             EF Calculated: 56.8 %     LA/Aorta: 1.15                                               LA volume/Index: 47.9 ml /20m^2     Doppler Measurements & Calculations      MV Peak E-Wave: 55.7 cm/s  AV Peak Velocity: 127 LVOT Peak Velocity: 111   MV Peak A-Wave: 94.7 cm/s  cm/s                  cm/s   MV E/A Ratio: 0.59         AV Peak Gradient:     LVOT Peak Gradient: 5   MV Peak Gradient: 1.24     6.45 mmHg             mmHg   mmHg                                                    TV Peak E-Wave: 47.1 cm/s   MV Deceleration Time: 271                        TV Peak A-Wave: 39.8 cm/s   msec                              IVRT: 102 msec        TV Peak Gradient: 0.89                                                    mmHg   MV E' Septal Velocity: 6.2                       TR Velocity:224 cm/s   cm/s                       AV DVI (Vmax):0.87    TR Gradient:20.07 mmHg   MV A' Septal Velocity:                           PV Peak Velocity: 94.7   14.6 cm/s                                        cm/s   MV E' Lateral Velocity:                          PV Peak Gradient: 3.59   9.7 cm/s                                         mmHg   MV A' Lateral Velocity:   13.6 cm/s   E/E' septal: 8.98   E/E' lateral: 5.74     [id number]        Assessment/Plan   LAD-Dx PCI 2/2020  EF 60-65%, NYHA I  HTN  HLD  , LDL 78. Increase Crestor to 40 mg q day, add Vascepa 2 gm BID. FL{P 3-6 months. Needs for elevated TG and CV risk reduction. BP is reasonable at home. No NTG SL prn. DAPT without bleeding. Discussed diet/exercise/BP/weight loss/health lifestyle choices/lipids; the patient understands the goals and will try to comply.         Disposition:  1 year         Electronically signed by Kareem Simmons MD   6/25/2020 at 9:04 AM EDT

## 2020-10-15 NOTE — TELEPHONE ENCOUNTER
Samy Lroenzana called requesting a refill on the following medications:  Requested Prescriptions     Pending Prescriptions Disp Refills    ticagrelor (BRILINTA) 90 MG TABS tablet 180 tablet 3     Sig: Take 1 tablet by mouth 2 times daily     Pharmacy verified:[email or web address]      Date of last visit: 6/25/20  Date of next visit (if applicable): Visit date not found

## 2020-12-17 ENCOUNTER — TELEPHONE (OUTPATIENT)
Dept: CARDIOLOGY CLINIC | Age: 75
End: 2020-12-17

## 2020-12-17 NOTE — TELEPHONE ENCOUNTER
Pre op Risk Assessment    Procedure :colonoscopy   Physician NIKO alas   Date of surgery/procedure 01/07/2021    Last OV 06/25/2020   Last Stress 01/22/2020  Last Echo 02/12/2020  Last Cath 02/06/2020  Last Stent 02/06/2020  Is patient on blood thinners Brilinta 90 mg bid   Aspirin EC 81 mg daily   Hold Meds/how many days : Hold brilinta for 5 days prior to colonoscopy  Please see attached form, fill out and fax to [phone]

## 2020-12-21 NOTE — TELEPHONE ENCOUNTER
May not stop dapt   He has two months till he is 1 year complete  Is this absolute necessary at this time?     If so then he can hold Brilinta for 5 days and restart afterwards although the risk is low for stent thrombosis its not zero    Thanks

## 2021-04-21 LAB
BILIRUBIN URINE: NORMAL
BLOOD, URINE: NEGATIVE
CLARITY: NORMAL
COLOR: YELLOW
GLUCOSE URINE: NEGATIVE
KETONES, URINE: NEGATIVE
LEUKOCYTE ESTERASE, URINE: NEGATIVE
NITRITE, URINE: NEGATIVE
PH UA: 6 (ref 4.5–8)
PROTEIN UA: NEGATIVE
SPECIFIC GRAVITY UA: 1.02 (ref 1–1.03)
UROBILINOGEN, URINE: NORMAL

## 2021-05-08 LAB
ALBUMIN SERPL-MCNC: 4.8 G/DL
ALP BLD-CCNC: 84 U/L
ALT SERPL-CCNC: 30 U/L
ANION GAP SERPL CALCULATED.3IONS-SCNC: NORMAL MMOL/L
AST SERPL-CCNC: 29 U/L
AVERAGE GLUCOSE: 137
BASOPHILS ABSOLUTE: NORMAL
BASOPHILS RELATIVE PERCENT: 0.7 %
BILIRUB SERPL-MCNC: 0.8 MG/DL (ref 0.1–1.4)
BILIRUBIN DIRECT: 0.2 MG/DL
BUN BLDV-MCNC: 15 MG/DL
CALCIUM SERPL-MCNC: 10.3 MG/DL
CHLORIDE BLD-SCNC: 100 MMOL/L
CHOLESTEROL, TOTAL: 166 MG/DL
CHOLESTEROL/HDL RATIO: NORMAL
CO2: 28 MMOL/L
CREAT SERPL-MCNC: 1 MG/DL
EOSINOPHILS ABSOLUTE: 0.4 /ΜL
EOSINOPHILS RELATIVE PERCENT: 5.4 %
FOLATE: NORMAL
GFR CALCULATED: NORMAL
GLUCOSE BLD-MCNC: 114 MG/DL
HBA1C MFR BLD: 6.4 %
HCT VFR BLD CALC: 49.3 % (ref 41–53)
HDLC SERPL-MCNC: 45 MG/DL (ref 35–70)
HEMOGLOBIN: 16.5 G/DL (ref 13.5–17.5)
IRON: 150
LDL CHOLESTEROL CALCULATED: 63 MG/DL (ref 0–160)
LYMPHOCYTES ABSOLUTE: 2.2 /ΜL
LYMPHOCYTES RELATIVE PERCENT: 33 %
MCH RBC QN AUTO: 32.3 PG
MCHC RBC AUTO-ENTMCNC: 33.6 G/DL
MCV RBC AUTO: 96.2 FL
MONOCYTES ABSOLUTE: 0.7 /ΜL
MONOCYTES RELATIVE PERCENT: 11.1 %
NEUTROPHILS ABSOLUTE: 3.3 /ΜL
NEUTROPHILS RELATIVE PERCENT: 49.8 %
NONHDLC SERPL-MCNC: NORMAL MG/DL
PDW BLD-RTO: 13.5 %
PHOSPHORUS: 3.6 MG/DL
PLATELET # BLD: 235 K/ΜL
PMV BLD AUTO: 8.8 FL
POTASSIUM SERPL-SCNC: 4.4 MMOL/L
PROSTATE SPECIFIC ANTIGEN: 0.69 NG/ML
RBC # BLD: 5.12 10^6/ΜL
SODIUM BLD-SCNC: 137 MMOL/L
TOTAL PROTEIN: 7.3
TRIGL SERPL-MCNC: 290 MG/DL
URIC ACID: 5.9
VITAMIN B-12: 196
VITAMIN D 25-HYDROXY: 39
VITAMIN D2, 25 HYDROXY: NORMAL
VITAMIN D3,25 HYDROXY: NORMAL
VLDLC SERPL CALC-MCNC: 58 MG/DL
WBC # BLD: 6.7 10^3/ML

## 2021-06-03 ENCOUNTER — HOSPITAL ENCOUNTER (OUTPATIENT)
Age: 76
Discharge: HOME | End: 2021-06-03
Payer: MEDICARE

## 2021-06-03 ENCOUNTER — HOSPITAL ENCOUNTER (OUTPATIENT)
Dept: GENERAL RADIOLOGY | Age: 76
Discharge: HOME | End: 2021-06-03
Payer: MEDICARE

## 2021-06-03 ENCOUNTER — OFFICE VISIT (OUTPATIENT)
Dept: UROLOGY | Age: 76
End: 2021-06-03
Payer: MEDICARE

## 2021-06-03 VITALS — HEIGHT: 73 IN | WEIGHT: 242.4 LBS | RESPIRATION RATE: 15 BRPM | BODY MASS INDEX: 32.13 KG/M2

## 2021-06-03 DIAGNOSIS — N40.1 BPH WITH OBSTRUCTION/LOWER URINARY TRACT SYMPTOMS: ICD-10-CM

## 2021-06-03 DIAGNOSIS — R39.12 WEAK URINARY STREAM: ICD-10-CM

## 2021-06-03 DIAGNOSIS — N20.0 NEPHROLITHIASIS: ICD-10-CM

## 2021-06-03 DIAGNOSIS — R35.0 URINARY FREQUENCY: ICD-10-CM

## 2021-06-03 DIAGNOSIS — N20.0 NEPHROLITHIASIS: Primary | ICD-10-CM

## 2021-06-03 DIAGNOSIS — R31.21 ASYMPTOMATIC MICROSCOPIC HEMATURIA: ICD-10-CM

## 2021-06-03 DIAGNOSIS — R35.1 NOCTURIA: ICD-10-CM

## 2021-06-03 DIAGNOSIS — N13.8 BPH WITH OBSTRUCTION/LOWER URINARY TRACT SYMPTOMS: ICD-10-CM

## 2021-06-03 LAB
BILIRUBIN URINE: NEGATIVE
BLOOD URINE, POC: NORMAL
CHARACTER, URINE: CLEAR
COLOR, URINE: YELLOW
GLUCOSE URINE: NEGATIVE MG/DL
KETONES, URINE: NEGATIVE
LEUKOCYTE CLUMPS, URINE: NEGATIVE
NITRITE, URINE: NEGATIVE
PH, URINE: 6 (ref 5–9)
PROTEIN, URINE: NEGATIVE MG/DL
SPECIFIC GRAVITY, URINE: 1.01 (ref 1–1.03)
UROBILINOGEN, URINE: 0.2 EU/DL (ref 0–1)

## 2021-06-03 PROCEDURE — 1036F TOBACCO NON-USER: CPT | Performed by: UROLOGY

## 2021-06-03 PROCEDURE — 81003 URINALYSIS AUTO W/O SCOPE: CPT | Performed by: UROLOGY

## 2021-06-03 PROCEDURE — G8427 DOCREV CUR MEDS BY ELIG CLIN: HCPCS | Performed by: UROLOGY

## 2021-06-03 PROCEDURE — 4040F PNEUMOC VAC/ADMIN/RCVD: CPT | Performed by: UROLOGY

## 2021-06-03 PROCEDURE — 1123F ACP DISCUSS/DSCN MKR DOCD: CPT | Performed by: UROLOGY

## 2021-06-03 PROCEDURE — 74018 RADEX ABDOMEN 1 VIEW: CPT

## 2021-06-03 PROCEDURE — 99204 OFFICE O/P NEW MOD 45 MIN: CPT | Performed by: UROLOGY

## 2021-06-03 PROCEDURE — G8417 CALC BMI ABV UP PARAM F/U: HCPCS | Performed by: UROLOGY

## 2021-06-03 RX ORDER — TAMSULOSIN HYDROCHLORIDE 0.4 MG/1
0.4 CAPSULE ORAL DAILY
Qty: 30 CAPSULE | Refills: 1 | Status: SHIPPED | OUTPATIENT
Start: 2021-06-03 | End: 2021-07-15 | Stop reason: SDUPTHER

## 2021-06-03 RX ORDER — OXYBUTYNIN CHLORIDE 5 MG/1
5 TABLET, EXTENDED RELEASE ORAL DAILY
Qty: 30 TABLET | Refills: 2 | Status: SHIPPED | OUTPATIENT
Start: 2021-06-03 | End: 2021-07-15 | Stop reason: SDUPTHER

## 2021-06-03 NOTE — PROGRESS NOTES
Rosemarie Michelle MD   Urology Clinic office Visit      Patient:  Christy Peterson  YOB: 1945  Date: 6/3/2021    HISTORY OF PRESENT ILLNESS:   The patient is a 67 y.o. male who presents today for evaluation of the following problem(s):      1. Nephrolithiasis    2. Asymptomatic microscopic hematuria    3. Nocturia    4. Urinary frequency    5. BPH with obstruction/lower urinary tract symptoms    6. Weak urinary stream           Overall the problem(s) : are worsening. Associated Symptoms: No dysuria, gross hematuria. Pain Severity:      Summary of old records: remote hx of kidney stone, passed it  (Patient's old records, notes and chart reviewed and summarized above.)      Onset years  Severity is described as moderate. The course of symptoms over time is worsening.   Alleviating factors: none  Worsening factors: none  Lower urinary tract symptoms: frequency and decreased urinary stream, urgency  UA reviewed and shows trace hgb  No gross heamturia  No ED  Former smoker  Drinks lots of coffee  PSA 5/2021 0.69    Last several PSA's:  No results found for: PSA    Last total testosterone:  No results found for: TESTOSTERONE    Urinalysis today:  Results for POC orders placed in visit on 06/03/21   POCT Urinalysis No Micro (Auto)   Result Value Ref Range    Glucose, Ur Negative NEGATIVE mg/dl    Bilirubin Urine Negative     Ketones, Urine Negative NEGATIVE    Specific Gravity, Urine 1.015 1.002 - 1.030    Blood, UA POC Trace-intact NEGATIVE    pH, Urine 6.00 5.0 - 9.0    Protein, Urine Negative NEGATIVE mg/dl    Urobilinogen, Urine 0.20 0.0 - 1.0 eu/dl    Nitrite, Urine Negative NEGATIVE    Leukocyte Clumps, Urine Negative NEGATIVE    Color, Urine Yellow YELLOW-STRAW    Character, Urine Clear CLR-SL.CLOUD         Last BUN and creatinine:  Lab Results   Component Value Date    BUN 14 02/06/2020     Lab Results   Component Value Date    CREATININE 1.0 02/06/2020       Imaging Reviewed during this Office Visit:   (results were independently reviewed by physician and radiology report verified)    PAST MEDICAL, FAMILY AND SOCIAL HISTORY:  Past Medical History:   Diagnosis Date    Cancer ([address]     skin cancer    History of kidney stones     Hyperlipidemia     Hypertension      Past Surgical History:   Procedure Laterality Date    COLONOSCOPY  01/2021    1 year repeat. removed 5 polyps (benign)     CORONARY ANGIOPLASTY WITH STENT PLACEMENT  2020     Family History   Problem Relation Age of Onset    Diabetes Mother     Heart Disease Father     Heart Disease Brother      Outpatient Medications Marked as Taking for the 6/3/21 encounter (Office Visit) with Edna Gonzalez MD   Medication Sig Dispense Refill    NONFORMULARY HERBAL SUPPLEMENTS: Vascular support black nakia, ultra prostate formula, cognitex elite,      tamsulosin (FLOMAX) 0.4 MG capsule Take 1 capsule by mouth daily 30 capsule 1    oxybutynin (DITROPAN XL) 5 MG extended release tablet Take 1 tablet by mouth daily 30 tablet 2    ticagrelor (BRILINTA) 90 MG TABS tablet Take 1 tablet by mouth 2 times daily 180 tablet 3    rosuvastatin (CRESTOR) 40 MG tablet Take 1 tablet by mouth daily 60 tablet 3    Icosapent Ethyl (VASCEPA) 1 g CAPS capsule Take 2 capsules by mouth 2 times daily 60 capsule 3    metoprolol succinate (TOPROL XL) 25 MG extended release tablet Take 1 tablet by mouth daily 90 tablet 3    nitroGLYCERIN (NITROSTAT) 0.4 MG SL tablet up to max of 3 total doses. If no relief after 1 dose, call 911. 25 tablet 3    Multiple Vitamins-Minerals (PRESERVISION AREDS 2 PO) Take 1 tablet by mouth daily       magnesium (MAGNESIUM-OXIDE) 250 MG TABS tablet Take 250 mg by mouth daily      lisinopril (PRINIVIL;ZESTRIL) 40 MG tablet New increased dose one daily 90 tablet 3    KRILL OIL PO Take  by mouth daily.  Cholecalciferol (VITAMIN D) 2000 UNITS CAPS capsule Take  by mouth daily.       ascorbic acid (VITAMIN C) 500 MG tablet Take 500 mg by mouth daily.  Coenzyme Q10 (CO Q 10 PO) Take  by mouth daily.  aspirin EC 81 MG EC tablet Take 81 mg by mouth daily.  UNABLE TO FIND Neuro B6 daily      UNABLE TO FIND Super Beta Prostate daily         Patient has no known allergies. Social History     Tobacco Use   Smoking Status Former Smoker    Packs/day: 1.00    Years: 3.00    Pack years: 3.00    Types: Cigarettes    Quit date: 18    Years since quittin.4   Smokeless Tobacco Never Used       Social History     Substance and Sexual Activity   Alcohol Use Yes    Alcohol/week: 0.0 standard drinks    Comment: socially       REVIEW OF SYSTEMS:  Constitutional: negative  Eyes: negative  Respiratory: negative  Cardiovascular: negative  Gastrointestinal: negative  Musculoskeletal: negative  Genitourinary: negative except for what is in HPI  Skin: negative   Neurological: negative  Hematological/Lymphatic: negative  Psychological: negative    Physical Exam:    This a 67 y.o. male   Vitals:    21 1036   Resp: 15     Constitutional: Patient in no acute distress; Neuro: alert and oriented to person place and time. Psych: Mood and affect normal.  Skin: Normal  Lungs: Respiratory effort normal  Cardiovascular:  Normal peripheral pulses  Abdomen: Soft, non-tender, non-distended   Bladder non-tender and not distended. Lymphatics: no palpable lymphadenopathy  Gait is within normal limits  Musculoskeletal: Normal range of motion       Assessment and Plan      1. Nephrolithiasis    2. Asymptomatic microscopic hematuria    3. Nocturia    4. Urinary frequency    5. BPH with obstruction/lower urinary tract symptoms    6. Weak urinary stream         Trial of Flomax and Ditropan  Follow up 6-8 weeks for office cystoscopy  Check KUB  Patient instructed to avoid bladder irritants in diet such as coffee, tea, caffeine, alcohol, carbonated beverages, spicy/acidic foods. Patient given a list of these to avoid.   Patient instructed to limit fluid intake 2-3 hours prior to bedtime to minimize nocturia or nocturnal incontinence.       Prescriptions Ordered:  Orders Placed This Encounter   Medications    tamsulosin (FLOMAX) 0.4 MG capsule     Sig: Take 1 capsule by mouth daily     Dispense:  30 capsule     Refill:  1    oxybutynin (DITROPAN XL) 5 MG extended release tablet     Sig: Take 1 tablet by mouth daily     Dispense:  30 tablet     Refill:  2      Orders Placed:  Orders Placed This Encounter   Procedures    XR ABDOMEN (KUB) (SINGLE AP VIEW)     Standing Status:   Future     Standing Expiration Date:   6/3/2022    POCT Urinalysis No Micro (Auto)            MD Dorothy Houser M.D, MD [facility]

## 2021-06-10 ENCOUNTER — TELEPHONE (OUTPATIENT)
Dept: CARDIOLOGY CLINIC | Age: 76
End: 2021-06-10

## 2021-06-10 NOTE — TELEPHONE ENCOUNTER
Edgar Live from PCP office called stating PCP wants to start pt on Vascepa. PCP wanted to check with Dr. Ed Vásquez to make sure it's okay to start pt on this due to currently taking Brilinta & there is an increased risk for bleeding with combining [address]. Gladystine Manner for pt to start Vascepa or any other recommendations?

## 2021-06-11 ENCOUNTER — TELEPHONE (OUTPATIENT)
Dept: UROLOGY | Age: 76
End: 2021-06-11

## 2021-06-25 RX ORDER — NITROGLYCERIN 0.4 MG/1
TABLET SUBLINGUAL
Qty: 25 TABLET | Refills: 0 | Status: SHIPPED | OUTPATIENT
Start: 2021-06-25 | End: 2022-07-21 | Stop reason: SDUPTHER

## 2021-07-15 ENCOUNTER — PROCEDURE VISIT (OUTPATIENT)
Dept: UROLOGY | Age: 76
End: 2021-07-15
Payer: MEDICARE

## 2021-07-15 ENCOUNTER — PATIENT MESSAGE (OUTPATIENT)
Dept: UROLOGY | Age: 76
End: 2021-07-15

## 2021-07-15 VITALS — HEIGHT: 73 IN | BODY MASS INDEX: 32.15 KG/M2 | WEIGHT: 242.6 LBS

## 2021-07-15 DIAGNOSIS — N13.8 BPH WITH OBSTRUCTION/LOWER URINARY TRACT SYMPTOMS: Primary | ICD-10-CM

## 2021-07-15 DIAGNOSIS — N20.0 NEPHROLITHIASIS: ICD-10-CM

## 2021-07-15 DIAGNOSIS — N40.1 BPH WITH OBSTRUCTION/LOWER URINARY TRACT SYMPTOMS: Primary | ICD-10-CM

## 2021-07-15 DIAGNOSIS — R35.1 NOCTURIA: ICD-10-CM

## 2021-07-15 DIAGNOSIS — R31.21 ASYMPTOMATIC MICROSCOPIC HEMATURIA: ICD-10-CM

## 2021-07-15 DIAGNOSIS — R39.12 WEAK URINARY STREAM: ICD-10-CM

## 2021-07-15 DIAGNOSIS — R35.0 URINARY FREQUENCY: ICD-10-CM

## 2021-07-15 PROCEDURE — 4040F PNEUMOC VAC/ADMIN/RCVD: CPT | Performed by: UROLOGY

## 2021-07-15 PROCEDURE — 52000 CYSTOURETHROSCOPY: CPT | Performed by: UROLOGY

## 2021-07-15 PROCEDURE — 1123F ACP DISCUSS/DSCN MKR DOCD: CPT | Performed by: UROLOGY

## 2021-07-15 PROCEDURE — G8427 DOCREV CUR MEDS BY ELIG CLIN: HCPCS | Performed by: UROLOGY

## 2021-07-15 PROCEDURE — 99213 OFFICE O/P EST LOW 20 MIN: CPT | Performed by: UROLOGY

## 2021-07-15 PROCEDURE — 1036F TOBACCO NON-USER: CPT | Performed by: UROLOGY

## 2021-07-15 PROCEDURE — G8417 CALC BMI ABV UP PARAM F/U: HCPCS | Performed by: UROLOGY

## 2021-07-15 RX ORDER — OXYBUTYNIN CHLORIDE 5 MG/1
5 TABLET, EXTENDED RELEASE ORAL DAILY
Qty: 90 TABLET | Refills: 1 | Status: SHIPPED | OUTPATIENT
Start: 2021-07-15 | End: 2021-12-13 | Stop reason: SDUPTHER

## 2021-07-15 RX ORDER — TAMSULOSIN HYDROCHLORIDE 0.4 MG/1
0.4 CAPSULE ORAL DAILY
Qty: 90 CAPSULE | Refills: 1 | Status: SHIPPED | OUTPATIENT
Start: 2021-07-15 | End: 2022-01-20 | Stop reason: SDUPTHER

## 2021-07-15 NOTE — PROGRESS NOTES
Brittanie Newberry MD   Urology Clinic office Visit      Patient:  Alexandra Villanueva  YOB: 1945  Date: 7/15/2021    HISTORY OF PRESENT ILLNESS:   The patient is a 67 y.o. male who presents today for evaluation of the following problem(s):      1. BPH with obstruction/lower urinary tract symptoms    2. Weak urinary stream    3. Urinary frequency    4. Nephrolithiasis    5. Asymptomatic microscopic hematuria    6. Nocturia           Overall the problem(s) : are improving  Associated Symptoms: No dysuria, gross hematuria. Pain Severity:      Summary of old records: remote hx of kidney stone, passed it  (Patient's old records, notes and chart reviewed and summarized above.)      Onset years  Severity is described as moderate. The course of symptoms over time is improving  Lower urinary tract symptoms: frequency and decreased urinary stream, urgency  UA reviewed and shows trace hgb  No gross heamturia  No ED  Former smoker  Drinks lots of coffee  PSA 5/2021 0.69    meds are working for him well  He is happier      I independently reviewed and verified the images and reports from:    XR ABDOMEN (KUB) (SINGLE AP VIEW)    Result Date: 6/3/2021  PROCEDURE: XR ABDOMEN (KUB) (SINGLE AP VIEW) CLINICAL INFORMATION: Nephrolithiasis COMPARISON: 12/16/2009 TECHNIQUE: 2 supine images of the abdomen were obtained. FINDINGS: Intestinal gas pattern is normal. I see no free air. No mass lesion is seen. Kidneys are somewhat obscured. . No definite renal or ureteral calculi are seen. There are moderate degenerative changes both hips and moderate degenerative changes scattered in  the lower lumbar spine. No acute findings. No definite renal calculi are seen. **This report has been created using voice recognition software. It may contain minor errors which are inherent in voice recognition technology. ** Final report electronically signed by Dr. Jose Alfredo Oden on 6/3/2021 5:12 PM      Last several PSA's:  Lab Results Component Value Date    PSA 0.69 05/08/2021       Last total testosterone:  No results found for: TESTOSTERONE    Urinalysis today:  No results found for this visit on 07/15/21. Last BUN and creatinine:  Lab Results   Component Value Date    BUN 15 05/08/2021     Lab Results   Component Value Date    CREATININE 1.0 05/08/2021       Imaging Reviewed during this Office Visit:   (results were independently reviewed by physician and radiology report verified)    PAST MEDICAL, FAMILY AND SOCIAL HISTORY:  Past Medical History:   Diagnosis Date    Cancer ([facility] [address]     skin cancer    History of kidney stones     Hyperlipidemia     Hypertension      Past Surgical History:   Procedure Laterality Date    COLONOSCOPY  01/2021    1 year repeat. removed 5 polyps (benign)     CORONARY ANGIOPLASTY WITH STENT PLACEMENT  2020     Family History   Problem Relation Age of Onset    Diabetes Mother     Heart Disease Father     Heart Disease Brother      Outpatient Medications Marked as Taking for the 7/15/21 encounter (Procedure visit) with Earnestine Gupta MD   Medication Sig Dispense Refill    oxybutynin (DITROPAN XL) 5 MG extended release tablet Take 1 tablet by mouth daily 90 tablet 1    tamsulosin (FLOMAX) 0.4 MG capsule Take 1 capsule by mouth daily 90 capsule 1    nitroGLYCERIN (NITROSTAT) 0.4 MG SL tablet up to max of 3 total doses.  If no relief after 1 dose, call 911. 25 tablet 0    NONFORMULARY HERBAL SUPPLEMENTS: Vascular support black nakia, ultra prostate formula, cognitex elite,      ticagrelor (BRILINTA) 90 MG TABS tablet Take 1 tablet by mouth 2 times daily 180 tablet 3    rosuvastatin (CRESTOR) 40 MG tablet Take 1 tablet by mouth daily 60 tablet 3    Icosapent Ethyl (VASCEPA) 1 g CAPS capsule Take 2 capsules by mouth 2 times daily 60 capsule 3    metoprolol succinate (TOPROL XL) 25 MG extended release tablet Take 1 tablet by mouth daily 90 tablet 3    Multiple Vitamins-Minerals (PRESERVISION AREDS 2

## 2021-07-16 NOTE — TELEPHONE ENCOUNTER
He started taking medications on 6/3/21  His symptoms began then? Please forward to Dr Kimberley Chase to see if he'd like to change medication  If having severe SOB, he should be seen in ER.

## 2021-07-16 NOTE — TELEPHONE ENCOUNTER
Received message from Dr Tanner Mejia stating . It is unlikely that the meds causing that.  He can experiment and try stopping them for a week or two and see if there is a difference either way

## 2021-07-16 NOTE — TELEPHONE ENCOUNTER
From: Kim Motta  To: Vivian Sibley M.D, MD  Sent: 7/15/2021 8:33 PM EDT  Subject: Non-Urgent Medical Question    Hi Dr. Gabriel Burks. I forgot to say something when I was in today. Since I started those two medicines Ive been really tired and some short of breath. Could this be from these new meds? They really help my bladder symptoms though. I see Dr. Young Mercer for my heart next Tuesday and was going to discuss with him also. Thought I would check with you first and go from there. Let me know your thoughts. Thanks!

## 2021-07-20 ENCOUNTER — OFFICE VISIT (OUTPATIENT)
Dept: CARDIOLOGY CLINIC | Age: 76
End: 2021-07-20
Payer: MEDICARE

## 2021-07-20 VITALS
HEIGHT: 73 IN | HEART RATE: 75 BPM | BODY MASS INDEX: 32.74 KG/M2 | WEIGHT: 247 LBS | DIASTOLIC BLOOD PRESSURE: 70 MMHG | SYSTOLIC BLOOD PRESSURE: 150 MMHG

## 2021-07-20 DIAGNOSIS — E78.5 HYPERLIPIDEMIA, UNSPECIFIED HYPERLIPIDEMIA TYPE: ICD-10-CM

## 2021-07-20 DIAGNOSIS — I25.10 CORONARY ARTERY DISEASE INVOLVING NATIVE CORONARY ARTERY OF NATIVE HEART WITHOUT ANGINA PECTORIS: Primary | ICD-10-CM

## 2021-07-20 PROCEDURE — 99214 OFFICE O/P EST MOD 30 MIN: CPT | Performed by: INTERNAL MEDICINE

## 2021-07-20 PROCEDURE — 1036F TOBACCO NON-USER: CPT | Performed by: INTERNAL MEDICINE

## 2021-07-20 PROCEDURE — G8417 CALC BMI ABV UP PARAM F/U: HCPCS | Performed by: INTERNAL MEDICINE

## 2021-07-20 PROCEDURE — G8427 DOCREV CUR MEDS BY ELIG CLIN: HCPCS | Performed by: INTERNAL MEDICINE

## 2021-07-20 PROCEDURE — 1123F ACP DISCUSS/DSCN MKR DOCD: CPT | Performed by: INTERNAL MEDICINE

## 2021-07-20 PROCEDURE — 4040F PNEUMOC VAC/ADMIN/RCVD: CPT | Performed by: INTERNAL MEDICINE

## 2021-07-20 PROCEDURE — 93000 ELECTROCARDIOGRAM COMPLETE: CPT | Performed by: INTERNAL MEDICINE

## 2021-07-20 RX ORDER — ICOSAPENT ETHYL 1000 MG/1
2 CAPSULE ORAL 2 TIMES DAILY
Qty: 60 CAPSULE | Refills: 3 | Status: SHIPPED | OUTPATIENT
Start: 2021-07-20 | End: 2022-08-10

## 2021-07-20 RX ORDER — ISOSORBIDE MONONITRATE 60 MG/1
60 TABLET, EXTENDED RELEASE ORAL DAILY
Qty: 30 TABLET | Refills: 3 | Status: SHIPPED | OUTPATIENT
Start: 2021-07-20 | End: 2022-01-20 | Stop reason: SDUPTHER

## 2021-07-20 NOTE — PROGRESS NOTES
[id number] [facility] [address] [facility] [address]  Dept: [phone]  Dept Fax: [phone]  Loc: [phone]    Visit Date: 7/20/2021    Mr. Motta is a 67 y.o. male  who presented for:  Chief Complaint   Patient presents with    Check-Up     EKG done today    Coronary Artery Disease       HPI:   HPI   68 yo M hx of LAD-Dx PCI 2/2020, EF 60-65% who presents for follow-up. DAPT without bleeding. No NTG SL prn. He recently stopped his meds on his own. He feels sob from time to time. It has improved and not bothering him significantly. BP 150s, but he thinks its in the 140s. Current Outpatient Medications:     VITAMIN E PO, Take by mouth, Disp: , Rfl:     ZINC PO, Take by mouth, Disp: , Rfl:     Cyanocobalamin (VITAMIN B12 PO), Take by mouth, Disp: , Rfl:     Ubiquinol 100 MG CAPS, Take by mouth, Disp: , Rfl:     Orange Peel (D-LIMONENE PO), Take by mouth, Disp: , Rfl:     NONFORMULARY, Cognitex Elite, Disp: , Rfl:     oxybutynin (DITROPAN XL) 5 MG extended release tablet, Take 1 tablet by mouth daily, Disp: 90 tablet, Rfl: 1    tamsulosin (FLOMAX) 0.4 MG capsule, Take 1 capsule by mouth daily, Disp: 90 capsule, Rfl: 1    nitroGLYCERIN (NITROSTAT) 0.4 MG SL tablet, up to max of 3 total doses.  If no relief after 1 dose, call 911., Disp: 25 tablet, Rfl: 0    NONFORMULARY, HERBAL SUPPLEMENTS: Vascular support black nakia, ultra prostate formula, cognitex elite,, Disp: , Rfl:     ticagrelor (BRILINTA) 90 MG TABS tablet, Take 1 tablet by mouth 2 times daily, Disp: 180 tablet, Rfl: 3    rosuvastatin (CRESTOR) 40 MG tablet, Take 1 tablet by mouth daily, Disp: 60 tablet, Rfl: 3    metoprolol succinate (TOPROL XL) 25 MG extended release tablet, Take 1 tablet by mouth daily, Disp: 90 tablet, Rfl: 3    Multiple Vitamins-Minerals (PRESERVISION AREDS 2 PO), Take 1 tablet by mouth daily , Disp: , Rfl:     magnesium (MAGNESIUM-OXIDE) 250 MG TABS tablet, Take 250 mg by mouth daily, Disp: , Rfl:     lisinopril (PRINIVIL;ZESTRIL) 40 MG tablet, New increased dose one daily, Disp: 90 tablet, Rfl: 3    KRILL OIL PO, Take  by mouth daily. , Disp: , Rfl:     Cholecalciferol (VITAMIN D) 2000 UNITS CAPS capsule, Take  by mouth daily. , Disp: , Rfl:     ascorbic acid (VITAMIN C) 500 MG tablet, Take 500 mg by mouth daily. , Disp: , Rfl:     Coenzyme Q10 (CO Q 10 PO), Take  by mouth daily. , Disp: , Rfl:     aspirin EC 81 MG EC tablet, Take 81 mg by mouth daily. , Disp: , Rfl:     UNABLE TO FIND, Super Beta Prostate daily, Disp: , Rfl:     Past Medical History  Gabriel Dodson  has a past medical history of Cancer ([facility] [address] History of kidney stones, Hyperlipidemia, and Hypertension. Social History  Gabriel Dodson  reports that he quit smoking about 26 years ago. His smoking use included cigarettes. He has a 3.00 pack-year smoking history. He has never used smokeless tobacco. He reports current alcohol use. He reports that he does not use drugs. Family History  Gabriel Dodson family history includes Diabetes in his mother; Heart Disease in his brother and father. There is no family history of bicuspid aortic valve, aneurysms, heart transplant, pacemakers, defibrillators, or sudden cardiac death. Past Surgical History   Past Surgical History:   Procedure Laterality Date    COLONOSCOPY  01/2021    1 year repeat. removed 5 polyps (benign)     CORONARY ANGIOPLASTY WITH STENT PLACEMENT  2020       Review of Systems   Constitutional: Negative for chills and fever  HENT: Negative for congestion, sinus pressure, sneezing and sore throat. Eyes: Negative for pain, discharge, redness and itching. Respiratory: Negative for apnea, cough  Gastrointestinal: Negative for blood in stool, constipation, diarrhea   Endocrine: Negative for cold intolerance, heat intolerance, polydipsia. Genitourinary: Negative for dysuria, enuresis, flank pain and hematuria. Musculoskeletal: Negative for arthralgias, joint swelling and neck pain. Neurological: Negative for numbness and headaches. Psychiatric/Behavioral: Negative for agitation, confusion, decreased concentration and dysphoric mood. Objective:     BP (!) 150/70   Pulse 75   Ht 6' 1\" (1.854 m)   Wt 247 lb (112 kg)   BMI 32.59 kg/m²     Wt Readings from Last 3 Encounters:   07/20/21 247 lb (112 kg)   07/15/21 242 lb 9.6 oz (110 kg)   06/03/21 242 lb 6.4 oz (110 kg)     BP Readings from Last 3 Encounters:   07/20/21 (!) 150/70   06/25/20 (!) 140/84   02/20/20 (!) 150/82       Nursing note and vitals reviewed. Physical Exam   Constitutional: Oriented to person, place, and time. Appears well-developed and well-nourished. HENT:   Head: Normocephalic and atraumatic. Eyes: EOM are normal. Pupils are equal, round, and reactive to light. Neck: Normal range of motion. Neck supple. No JVD present. Cardiovascular: Normal rate, regular rhythm, normal heart sounds and intact distal pulses. No murmur heard. Pulmonary/Chest: Effort normal and breath sounds normal. No respiratory distress. No wheezes. No rales. Abdominal: Soft. Bowel sounds are normal. No distension. There is no tenderness. Musculoskeletal: Normal range of motion. No edema. Neurological: Alert and oriented to person, place, and time. No cranial nerve deficit. Coordination normal.   Skin: Skin is warm and dry. Psychiatric: Normal mood and affect.        No results found for: CKTOTAL, CKMB, CKMBINDEX    Lab Results   Component Value Date    WBC 6.7 05/08/2021    RBC 5.12 05/08/2021    HGB 16.5 05/08/2021    HCT 49.3 05/08/2021    MCV 96.2 05/08/2021    MCH 32.3 05/08/2021    MCHC 33.6 05/08/2021    RDW 13.5 05/08/2021     05/08/2021    MPV 8.8 05/08/2021       Lab Results   Component Value Date     05/08/2021    K 4.4 05/08/2021    K 4.1 02/06/2020     05/08/2021    CO2 28 05/08/2021    BUN 15 05/08/2021    VASYLALJAREN 4.8 05/08/2021    CREATININE 1.0 05/08/2021    CALCIUM 10.3 05/08/2021    LABGLOM  05/08/2021      Comment:      AA 88, LULY 73    LABGLOM 73 02/06/2020    GLUCOSE 114 05/08/2021       Lab Results   Component Value Date    ALKPHOS 84 05/08/2021    ALT 30 05/08/2021    AST 29 05/08/2021    BILITOT 0.8 05/08/2021    BILIDIR 0.2 05/08/2021    LABALBU 4.8 05/08/2021       No results found for: MG    Lab Results   Component Value Date    INR 1.02 02/06/2020         Lab Results   Component Value Date    LABA1C 6.4 05/08/2021       Lab Results   Component Value Date    TRIG 290 05/08/2021    HDL 45 05/08/2021    LDLCALC 63 05/08/2021       No results found for: TSH      Testing Reviewed:      I have individually reviewed the cardiac test below:    ECHO: Results for orders placed during the hospital encounter of 02/12/20    ECHO Complete 2D W Doppler W Color    Narrative  Transthoracic Echocardiography Report (TTE)    Demographics    Patient Name    FERN Negron Gender                Male    MR #            [id number]      Race                      Ethnicity    Account #       [de-identified]      Room Number    Accession       [id number]      Date of Study         02/12/2020  Number    Date of Birth   1945     Referring Physician   Elmira Iraheta CNP    Age             75 year(s)     Dion Garrett MD  Physician    Procedure    Type of Study    TTE procedure:ECHOCARDIOGRAM COMPLETE 2D W DOPPLER W COLOR. Procedure Date  Date: 02/12/2020 Start: 09:54 AM    Study Location: Echo Lab  Technical Quality: Limited visualization due to poor acoustical window. Indications:S/P percutaneous stent placement.     Additional Medical History:Sleep apnea, shortness of breath, CAD with recent  stents, abnormal stress, family history of CAD    Patient Status: Routine    Height: 73 inches Weight: 251.01 pounds BSA: 2.37 m^2 BMI: 33.12 kg/m^2    BP: effusion. Aorta / Great Vessels  -Ascending aorta = 3.0 cm. -IVC size is within normal limits with normal respiratory phasic changes. M-Mode/2D Measurements & Calculations    LV Diastolic   LV Systolic Dimension:    AV Cusp Separation: 1.8 cmLA  Dimension: 4.4 3.1 cm                    Dimension: 3.8 cmAO Root  cm             LV Volume Diastolic: 59.2 Dimension: 3.3 cmLA Area: 17.7  LV FS:29.6 %   ml                        cm^2  LV PW          LV Volume Systolic: 46.4  Diastolic: 1   ml  cm             LV EDV/LV EDV Index: 87.7  Septum         ml/37 m^2LV ESV/LV ESV    RV Diastolic Dimension: 2.3 cm  Diastolic: 0.7 Index: 06.7 ml/16 m^2  cm             EF Calculated: 56.8 %     LA/Aorta: 1.15    LA volume/Index: 47.9 ml /20m^2    Doppler Measurements & Calculations    MV Peak E-Wave: 55.7 cm/s  AV Peak Velocity: 127 LVOT Peak Velocity: 111  MV Peak A-Wave: 94.7 cm/s  cm/s                  cm/s  MV E/A Ratio: 0.59         AV Peak Gradient:     LVOT Peak Gradient: 5  MV Peak Gradient: 1.24     6.45 mmHg             mmHg  mmHg  TV Peak E-Wave: 47.1 cm/s  MV Deceleration Time: 271                        TV Peak A-Wave: 39.8 cm/s  msec  IVRT: 102 msec        TV Peak Gradient: 0.89  mmHg  MV E' Septal Velocity: 6.2                       TR Velocity:224 cm/s  cm/s                       AV DVI (Vmax):0.87    TR Gradient:20.07 mmHg  MV A' Septal Velocity:                           PV Peak Velocity: 94.7  14.6 cm/s                                        cm/s  MV E' Lateral Velocity:                          PV Peak Gradient: 3.59  9.7 cm/s                                         mmHg  MV A' Lateral Velocity:  13.6 cm/s  E/E' septal: 8.98  E/E' lateral: 5.74    [email or web address] [id number]       Assessment/Plan   LAD-Dx PCI 2/2020  EF 60-65%, NYHA I  HTN - uncontrolled  HLD  He is 5 lbs down, sob still very intermittent. Add Imdur 60 mg ER q day. , higher than prior. Add FishOil 2 mg BID vs Vascepa if affordable. The patient was advised on risk/benefits of the new Rx and he agreed to proceed with the medication(s). He needs to concentrate on weight loss and diet. Brilinta does not appear to correlate. Monitor BP. Continue DAPT. FLP 6 months. Discussed diet/exercise/BP/weight loss/health lifestyle choices/lipids; the patient understands the goals and will try to comply.        Disposition:  1 year         Electronically signed by Umu Doherty MD   7/20/2021 at 8:35 AM EDT

## 2021-08-09 RX ORDER — METOPROLOL SUCCINATE 25 MG/1
25 TABLET, EXTENDED RELEASE ORAL DAILY
Qty: 90 TABLET | Refills: 3 | Status: SHIPPED
Start: 2021-08-09 | End: 2022-07-21 | Stop reason: SDUPTHER

## 2021-08-09 RX ORDER — METOPROLOL SUCCINATE 25 MG/1
TABLET, EXTENDED RELEASE ORAL
Qty: 90 TABLET | Refills: 3 | Status: SHIPPED | OUTPATIENT
Start: 2021-08-09 | End: 2022-10-03

## 2021-08-09 NOTE — TELEPHONE ENCOUNTER
Dru Tomlinson called requesting a refill on the following medications:  Requested Prescriptions     Pending Prescriptions Disp Refills    metoprolol succinate (TOPROL XL) 25 MG extended release tablet 90 tablet 3     Sig: Take 1 tablet by mouth daily     [email or web address]      Date of last visit:   Date of next visit (if applicable): Visit date not found

## 2021-09-16 ENCOUNTER — TELEPHONE (OUTPATIENT)
Dept: CARDIOLOGY CLINIC | Age: 76
End: 2021-09-16

## 2021-09-16 NOTE — TELEPHONE ENCOUNTER
Pt was seen by Dr. Arvind Bingham 7-20-21. Imdur 60MG QD was prescribed. Pt is asking if he can stop this medication. Pt states he does not have any chest pain and never has had chest pain so he is unsure why this medication was prescribed for him.

## 2021-12-10 DIAGNOSIS — N13.8 BPH WITH OBSTRUCTION/LOWER URINARY TRACT SYMPTOMS: ICD-10-CM

## 2021-12-10 DIAGNOSIS — N40.1 BPH WITH OBSTRUCTION/LOWER URINARY TRACT SYMPTOMS: ICD-10-CM

## 2021-12-10 NOTE — TELEPHONE ENCOUNTER
Nette Singh called requesting a refill on the following medications:  Requested Prescriptions     Pending Prescriptions Disp Refills    oxybutynin (DITROPAN XL) 5 MG extended release tablet 90 tablet 1     Sig: Take 1 tablet by mouth daily     Pharmacy verified:  .pv  [address] [phone]      Date of last visit: 07/15/2021  Date of next visit (if applicable): 2/36/6183

## 2021-12-13 RX ORDER — OXYBUTYNIN CHLORIDE 5 MG/1
5 TABLET, EXTENDED RELEASE ORAL DAILY
Qty: 90 TABLET | Refills: 1 | Status: SHIPPED | OUTPATIENT
Start: 2021-12-13 | End: 2022-01-20 | Stop reason: SDUPTHER

## 2022-01-20 ENCOUNTER — OFFICE VISIT (OUTPATIENT)
Dept: UROLOGY | Age: 77
End: 2022-01-20
Payer: MEDICARE

## 2022-01-20 VITALS
BODY MASS INDEX: 31.41 KG/M2 | WEIGHT: 237 LBS | DIASTOLIC BLOOD PRESSURE: 70 MMHG | HEIGHT: 73 IN | SYSTOLIC BLOOD PRESSURE: 148 MMHG

## 2022-01-20 DIAGNOSIS — R31.21 ASYMPTOMATIC MICROSCOPIC HEMATURIA: ICD-10-CM

## 2022-01-20 DIAGNOSIS — N13.8 BPH WITH OBSTRUCTION/LOWER URINARY TRACT SYMPTOMS: Primary | ICD-10-CM

## 2022-01-20 DIAGNOSIS — N40.1 BPH WITH OBSTRUCTION/LOWER URINARY TRACT SYMPTOMS: Primary | ICD-10-CM

## 2022-01-20 DIAGNOSIS — N20.0 NEPHROLITHIASIS: ICD-10-CM

## 2022-01-20 DIAGNOSIS — R35.1 NOCTURIA: ICD-10-CM

## 2022-01-20 DIAGNOSIS — R39.12 WEAK URINARY STREAM: ICD-10-CM

## 2022-01-20 DIAGNOSIS — R35.0 URINARY FREQUENCY: ICD-10-CM

## 2022-01-20 PROCEDURE — G8427 DOCREV CUR MEDS BY ELIG CLIN: HCPCS | Performed by: UROLOGY

## 2022-01-20 PROCEDURE — 99214 OFFICE O/P EST MOD 30 MIN: CPT | Performed by: UROLOGY

## 2022-01-20 PROCEDURE — 4040F PNEUMOC VAC/ADMIN/RCVD: CPT | Performed by: UROLOGY

## 2022-01-20 PROCEDURE — G8484 FLU IMMUNIZE NO ADMIN: HCPCS | Performed by: UROLOGY

## 2022-01-20 PROCEDURE — 1036F TOBACCO NON-USER: CPT | Performed by: UROLOGY

## 2022-01-20 PROCEDURE — G8417 CALC BMI ABV UP PARAM F/U: HCPCS | Performed by: UROLOGY

## 2022-01-20 PROCEDURE — 1123F ACP DISCUSS/DSCN MKR DOCD: CPT | Performed by: UROLOGY

## 2022-01-20 RX ORDER — OXYBUTYNIN CHLORIDE 5 MG/1
5 TABLET, EXTENDED RELEASE ORAL DAILY
Qty: 90 TABLET | Refills: 3 | Status: SHIPPED | OUTPATIENT
Start: 2022-01-20 | End: 2022-07-21

## 2022-01-20 RX ORDER — TAMSULOSIN HYDROCHLORIDE 0.4 MG/1
0.4 CAPSULE ORAL DAILY
Qty: 90 CAPSULE | Refills: 3 | Status: SHIPPED | OUTPATIENT
Start: 2022-01-20 | End: 2022-07-21

## 2022-01-20 RX ORDER — ISOSORBIDE MONONITRATE 60 MG/1
60 TABLET, EXTENDED RELEASE ORAL DAILY
Qty: 30 TABLET | Refills: 7 | Status: SHIPPED | OUTPATIENT
Start: 2022-01-20 | End: 2022-07-21

## 2022-01-20 NOTE — PROGRESS NOTES
Gentry Curling, MD   Urology Clinic office Visit      Patient:  Kristine Hough  YOB: 1945  Date: 1/20/2022    HISTORY OF PRESENT ILLNESS:   The patient is a 67 y.o. male who presents today for evaluation of the following problem(s):      1. BPH with obstruction/lower urinary tract symptoms    2. Nephrolithiasis    3. Asymptomatic microscopic hematuria    4. Weak urinary stream    5. Urinary frequency    6. Nocturia           Overall the problem(s) : are improving  Associated Symptoms: No dysuria, gross hematuria. Pain Severity:      Summary of old records: remote hx of kidney stone, passed it  (Patient's old records, notes and chart reviewed and summarized above.)      Onset years  Severity is described as moderate. The course of symptoms over time is improving  Lower urinary tract symptoms: frequency and decreased urinary stream, urgency  UA reviewed and shows trace hgb  No gross heamturia  No ED  Former smoker  Drinks lots of coffee  PSA 5/2021 0.69    meds are working for him well; no hematuria, no UTIs        Cystoscopy 7/2021  Prostate: Complete obstruction by lateral lobe of prostate. Bladder: No tumors or CIS noted. Small dome bladder diverticulum. Moderate trabeculation noted. XR ABDOMEN (KUB) (SINGLE AP VIEW)  Result Date: 6/3/2021  No acute findings. No definite renal calculi are seen. Last several PSA's:  Lab Results   Component Value Date    PSA 0.69 05/08/2021       Last total testosterone:  No results found for: TESTOSTERONE    Urinalysis today:  No results found for this visit on 01/20/22.       Last BUN and creatinine:  Lab Results   Component Value Date    BUN 15 05/08/2021     Lab Results   Component Value Date    CREATININE 1.0 05/08/2021       Imaging Reviewed during this Office Visit:   (results were independently reviewed by physician and radiology report verified)    PAST MEDICAL, FAMILY AND SOCIAL HISTORY:  Past Medical History:   Diagnosis Date    Cancer ([facility] [address]     skin cancer    History of kidney stones     Hyperlipidemia     Hypertension      Past Surgical History:   Procedure Laterality Date    COLONOSCOPY  01/2021    1 year repeat. removed 5 polyps (benign)     CORONARY ANGIOPLASTY WITH STENT PLACEMENT  2020     Family History   Problem Relation Age of Onset    Diabetes Mother     Heart Disease Father     Heart Disease Brother      Outpatient Medications Marked as Taking for the 1/20/22 encounter (Office Visit) with Anita Frye MD   Medication Sig Dispense Refill    tamsulosin (FLOMAX) 0.4 MG capsule Take 1 capsule by mouth daily 90 capsule 3    oxybutynin (DITROPAN XL) 5 MG extended release tablet Take 1 tablet by mouth daily 90 tablet 3    metoprolol succinate (TOPROL XL) 25 MG extended release tablet TAKE ONE TABLET BY MOUTH EVERY DAY 90 tablet 3    metoprolol succinate (TOPROL XL) 25 MG extended release tablet Take 1 tablet by mouth daily 90 tablet 3    VITAMIN E PO Take by mouth      ZINC PO Take by mouth      Cyanocobalamin (VITAMIN B12 PO) Take by mouth      Ubiquinol 100 MG CAPS Take by mouth      Orange Peel (D-LIMONENE PO) Take by mouth      NONFORMULARY Cognitex Elite      isosorbide mononitrate (IMDUR) 60 MG extended release tablet Take 1 tablet by mouth daily 30 tablet 3    Icosapent Ethyl (VASCEPA) 1 g CAPS capsule Take 2 capsules by mouth 2 times daily 60 capsule 3    nitroGLYCERIN (NITROSTAT) 0.4 MG SL tablet up to max of 3 total doses.  If no relief after 1 dose, call 911. 25 tablet 0    NONFORMULARY HERBAL SUPPLEMENTS: Vascular support black nakia, ultra prostate formula, cognitex elite,      ticagrelor (BRILINTA) 90 MG TABS tablet Take 1 tablet by mouth 2 times daily 180 tablet 3    rosuvastatin (CRESTOR) 40 MG tablet Take 1 tablet by mouth daily 60 tablet 3    Multiple Vitamins-Minerals (PRESERVISION AREDS 2 PO) Take 1 tablet by mouth daily       magnesium (MAGNESIUM-OXIDE) 250 MG TABS tablet Take 250 mg by mouth daily      lisinopril (PRINIVIL;ZESTRIL) 40 MG tablet New increased dose one daily 90 tablet 3    KRILL OIL PO Take  by mouth daily.  Cholecalciferol (VITAMIN D) 2000 UNITS CAPS capsule Take  by mouth daily.  ascorbic acid (VITAMIN C) 500 MG tablet Take 500 mg by mouth daily.  Coenzyme Q10 (CO Q 10 PO) Take  by mouth daily.  aspirin EC 81 MG EC tablet Take 81 mg by mouth daily.  UNABLE TO FIND Super Beta Prostate daily         Patient has no known allergies. Social History     Tobacco Use   Smoking Status Former Smoker    Packs/day: 1.00    Years: 3.00    Pack years: 3.00    Types: Cigarettes    Quit date: 17    Years since quittin.0   Smokeless Tobacco Never Used       Social History     Substance and Sexual Activity   Alcohol Use Yes    Alcohol/week: 0.0 standard drinks    Comment: socially       REVIEW OF SYSTEMS:  Constitutional: negative  Eyes: negative  Respiratory: negative  Cardiovascular: negative  Gastrointestinal: negative  Musculoskeletal: negative  Genitourinary: negative except for what is in HPI  Skin: negative   Neurological: negative  Hematological/Lymphatic: negative  Psychological: negative    Physical Exam:    This a 67 y.o. male   Vitals:    22 0846   BP: (!) 148/70     Constitutional: Patient in no acute distress; Neuro: alert and oriented to person place and time. Assessment and Plan      1. BPH with obstruction/lower urinary tract symptoms    2. Nephrolithiasis    3. Asymptomatic microscopic hematuria    4. Weak urinary stream    5. Urinary frequency    6. Nocturia               cystoscopy above is negative for tumors, enlarged prostate  Medications working well for him  Flomax and Ditropan going well for him; will refill  Previous KUB- no stones. Recheck 1 year.          Prescriptions Ordered:  Orders Placed This Encounter   Medications    tamsulosin (FLOMAX) 0.4 MG capsule     Sig: Take 1 capsule by mouth daily Dispense:  90 capsule     Refill:  3    oxybutynin (DITROPAN XL) 5 MG extended release tablet     Sig: Take 1 tablet by mouth daily     Dispense:  90 tablet     Refill:  3      Orders Placed:  Orders Placed This Encounter   Procedures    XR ABDOMEN (KUB) (SINGLE AP VIEW)     Standing Status:   Future     Standing Expiration Date:   7/20/2023            MD Rei Mendieta M.D, MD [facility]

## 2022-01-20 NOTE — TELEPHONE ENCOUNTER
Danii Vivas called requesting a refill on the following medications:  Requested Prescriptions     Pending Prescriptions Disp Refills    isosorbide mononitrate (IMDUR) 60 MG extended release tablet 30 tablet 3     Sig: Take 1 tablet by mouth daily     Pharmacy verified: [address] [place] [address] in [facility]  . pv      Date of last visit: 7/20/2021  Date of next visit (if applicable): 4/95/3303

## 2022-07-21 ENCOUNTER — OFFICE VISIT (OUTPATIENT)
Dept: CARDIOLOGY CLINIC | Age: 77
End: 2022-07-21
Payer: MEDICARE

## 2022-07-21 VITALS
DIASTOLIC BLOOD PRESSURE: 70 MMHG | HEART RATE: 76 BPM | WEIGHT: 243.8 LBS | BODY MASS INDEX: 32.31 KG/M2 | SYSTOLIC BLOOD PRESSURE: 150 MMHG | HEIGHT: 73 IN

## 2022-07-21 DIAGNOSIS — I10 PRIMARY HYPERTENSION: ICD-10-CM

## 2022-07-21 DIAGNOSIS — I25.10 CORONARY ARTERY DISEASE INVOLVING NATIVE CORONARY ARTERY OF NATIVE HEART WITHOUT ANGINA PECTORIS: Primary | ICD-10-CM

## 2022-07-21 DIAGNOSIS — E78.5 HYPERLIPIDEMIA, UNSPECIFIED HYPERLIPIDEMIA TYPE: ICD-10-CM

## 2022-07-21 PROCEDURE — 1036F TOBACCO NON-USER: CPT | Performed by: INTERNAL MEDICINE

## 2022-07-21 PROCEDURE — 1123F ACP DISCUSS/DSCN MKR DOCD: CPT | Performed by: INTERNAL MEDICINE

## 2022-07-21 PROCEDURE — G8417 CALC BMI ABV UP PARAM F/U: HCPCS | Performed by: INTERNAL MEDICINE

## 2022-07-21 PROCEDURE — G8427 DOCREV CUR MEDS BY ELIG CLIN: HCPCS | Performed by: INTERNAL MEDICINE

## 2022-07-21 PROCEDURE — 93000 ELECTROCARDIOGRAM COMPLETE: CPT | Performed by: INTERNAL MEDICINE

## 2022-07-21 PROCEDURE — 99214 OFFICE O/P EST MOD 30 MIN: CPT | Performed by: INTERNAL MEDICINE

## 2022-07-21 RX ORDER — NITROGLYCERIN 0.4 MG/1
TABLET SUBLINGUAL
Qty: 25 TABLET | Refills: 3 | Status: SHIPPED | OUTPATIENT
Start: 2022-07-21

## 2022-07-21 RX ORDER — ISOSORBIDE MONONITRATE 120 MG/1
120 TABLET, EXTENDED RELEASE ORAL DAILY
Qty: 90 TABLET | Refills: 3 | Status: SHIPPED | OUTPATIENT
Start: 2022-07-21

## 2022-07-21 NOTE — PROGRESS NOTES
[address] [facility] [address]  Dept: [phone]  Dept Fax: [phone]  Loc: [phone]    Visit Date: 7/21/2022    Mr. Motta is a 65 y.o. male  who presented for:  Chief Complaint   Patient presents with    1 Year Follow Up    Coronary Artery Disease       HPI:   HPI   66 yo M hx of LAD-Dx PCI 2/2020, EF 60-65% who presents for follow-up. No chest pain, angina, KEY, orthopnea, PND, sob at rest, palpitations, LE edema, or syncope. LDL 61. Usually BP 140s at home. Taking all meds, no bleeding. Current Outpatient Medications:     tamsulosin (FLOMAX) 0.4 MG capsule, Take 1 capsule by mouth daily, Disp: 90 capsule, Rfl: 3    oxybutynin (DITROPAN XL) 5 MG extended release tablet, Take 1 tablet by mouth daily, Disp: 90 tablet, Rfl: 3    metoprolol succinate (TOPROL XL) 25 MG extended release tablet, TAKE ONE TABLET BY MOUTH EVERY DAY, Disp: 90 tablet, Rfl: 3    VITAMIN E PO, Take by mouth, Disp: , Rfl:     ZINC PO, Take by mouth, Disp: , Rfl:     Cyanocobalamin (VITAMIN B12 PO), Take by mouth, Disp: , Rfl:     Ubiquinol 100 MG CAPS, Take by mouth, Disp: , Rfl:     Orange Peel (D-LIMONENE PO), Take by mouth, Disp: , Rfl:     NONFORMULARY, Cognitex Elite, Disp: , Rfl:     Icosapent Ethyl (VASCEPA) 1 g CAPS capsule, Take 2 capsules by mouth 2 times daily, Disp: 60 capsule, Rfl: 3    nitroGLYCERIN (NITROSTAT) 0.4 MG SL tablet, up to max of 3 total doses.  If no relief after 1 dose, call 911., Disp: 25 tablet, Rfl: 0    NONFORMULARY, HERBAL SUPPLEMENTS: Vascular support black nakia, ultra prostate formula, cognitex elite,, Disp: , Rfl:     ticagrelor (BRILINTA) 90 MG TABS tablet, Take 1 tablet by mouth 2 times daily, Disp: 180 tablet, Rfl: 3    rosuvastatin (CRESTOR) 40 MG tablet, Take 1 tablet by mouth daily, Disp: 60 tablet, Rfl: 3    Multiple Vitamins-Minerals (PRESERVISION AREDS 2 PO), Take 1 tablet by mouth daily , Disp: , Rfl:     magnesium (MAGNESIUM-OXIDE) 250 MG TABS tablet, Take 250 mg by mouth daily, Disp: , Rfl:     lisinopril (PRINIVIL;ZESTRIL) 40 MG tablet, New increased dose one daily (Patient taking differently: Take 40 mg by mouth in the morning.), Disp: 90 tablet, Rfl: 3    KRILL OIL PO, Take  by mouth daily. , Disp: , Rfl:     Cholecalciferol (VITAMIN D) 2000 UNITS CAPS capsule, Take  by mouth daily. , Disp: , Rfl:     ascorbic acid (VITAMIN C) 500 MG tablet, Take 500 mg by mouth daily. , Disp: , Rfl:     Coenzyme Q10 (CO Q 10 PO), Take  by mouth daily. , Disp: , Rfl:     aspirin EC 81 MG EC tablet, Take 81 mg by mouth daily. , Disp: , Rfl:     UNABLE TO FIND, Super Beta Prostate daily, Disp: , Rfl:     isosorbide mononitrate (IMDUR) 60 MG extended release tablet, Take 1 tablet by mouth daily (Patient not taking: Reported on 7/21/2022), Disp: 30 tablet, Rfl: 7    Past Medical History  Mary Mayo  has a past medical history of Cancer ([facility] [address] History of kidney stones, Hyperlipidemia, and Hypertension. Social History  Mary Mayo  reports that he quit smoking about 27 years ago. His smoking use included cigarettes. He has a 3.00 pack-year smoking history. He has never used smokeless tobacco. He reports that he does not currently use alcohol after a past usage of about 4.0 standard drinks per week. He reports that he does not use drugs. Family History  Mary Mayo family history includes Diabetes in his mother; Heart Disease in his brother and father. There is no family history of bicuspid aortic valve, aneurysms, heart transplant, pacemakers, defibrillators, or sudden cardiac death. Past Surgical History   Past Surgical History:   Procedure Laterality Date    COLONOSCOPY  01/2021    1 year repeat. removed 5 polyps (benign)     CORONARY ANGIOPLASTY WITH STENT PLACEMENT  2020       Review of Systems   Constitutional: Negative for chills and fever  HENT: Negative for congestion, sinus pressure, sneezing and sore throat. Eyes: Negative for pain, discharge, redness and itching. Respiratory: Negative for apnea, cough  Gastrointestinal: Negative for blood in stool, constipation, diarrhea   Endocrine: Negative for cold intolerance, heat intolerance, polydipsia. Genitourinary: Negative for dysuria, enuresis, flank pain and hematuria. Musculoskeletal: Negative for arthralgias, joint swelling and neck pain. Neurological: Negative for numbness and headaches. Psychiatric/Behavioral: Negative for agitation, confusion, decreased concentration and dysphoric mood. Objective:     BP (!) 150/70 (Site: Right Upper Arm)   Pulse 76   Ht 6' 1\" (1.854 m)   Wt 243 lb 12.8 oz (110.6 kg)   BMI 32.17 kg/m²     Wt Readings from Last 3 Encounters:   07/21/22 243 lb 12.8 oz (110.6 kg)   01/20/22 237 lb (107.5 kg)   07/20/21 247 lb (112 kg)     BP Readings from Last 3 Encounters:   07/21/22 (!) 150/70   01/20/22 (!) 148/70   07/20/21 (!) 150/70       Nursing note and vitals reviewed. Physical Exam   Constitutional: Oriented to person, place, and time. Appears well-developed and well-nourished. HENT:   Head: Normocephalic and atraumatic. Eyes: EOM are normal. Pupils are equal, round, and reactive to light. Neck: Normal range of motion. Neck supple. No JVD present. Cardiovascular: Normal rate, regular rhythm, normal heart sounds and intact distal pulses. No murmur heard. Pulmonary/Chest: Effort normal and breath sounds normal. No respiratory distress. No wheezes. No rales. Abdominal: Soft. Bowel sounds are normal. No distension. There is no tenderness. Musculoskeletal: Normal range of motion. No edema. Neurological: Alert and oriented to person, place, and time. No cranial nerve deficit. Coordination normal.   Skin: Skin is warm and dry. Psychiatric: Normal mood and affect.        No results found for: CKTOTAL, CKMB, CKMBINDEX    Lab Results   Component Value Date/Time    WBC 6.7 05/08/2021 12:00 AM    RBC 5.12 05/08/2021 12:00 AM    HGB 16.5 05/08/2021 12:00 AM    HCT 49.3 05/08/2021 12:00 AM    MCV 96.2 05/08/2021 12:00 AM    MCH 32.3 05/08/2021 12:00 AM    MCHC 33.6 05/08/2021 12:00 AM    RDW 13.5 05/08/2021 12:00 AM     05/08/2021 12:00 AM    MPV 8.8 05/08/2021 12:00 AM       Lab Results   Component Value Date/Time     05/08/2021 12:00 AM    K 4.4 05/08/2021 12:00 AM    K 4.1 02/06/2020 08:12 AM     05/08/2021 12:00 AM    CO2 28 05/08/2021 12:00 AM    BUN 15 05/08/2021 12:00 AM    LABALBU 4.8 05/08/2021 12:00 AM    CREATININE 1.0 05/08/2021 12:00 AM    CALCIUM 10.3 05/08/2021 12:00 AM    LABGLOM  05/08/2021 12:00 AM      Comment:      AA 88, LULY 73    LABGLOM 73 02/06/2020 08:12 AM    GLUCOSE 114 05/08/2021 12:00 AM       Lab Results   Component Value Date/Time    ALKPHOS 84 05/08/2021 12:00 AM    ALT 30 05/08/2021 12:00 AM    AST 29 05/08/2021 12:00 AM    BILITOT 0.8 05/08/2021 12:00 AM    BILIDIR 0.2 05/08/2021 12:00 AM    LABALBU 4.8 05/08/2021 12:00 AM       No results found for: MG    Lab Results   Component Value Date    INR 1.02 02/06/2020         Lab Results   Component Value Date/Time    LABA1C 6.4 05/08/2021 12:00 AM       Lab Results   Component Value Date/Time    TRIG 290 05/08/2021 12:00 AM    HDL 45 05/08/2021 12:00 AM    LDLCALC 63 05/08/2021 12:00 AM       No results found for: TSH      Testing Reviewed:      I have individually reviewed the cardiac test below:    ECHO: Results for orders placed during the hospital encounter of 02/12/20    ECHO Complete 2D W Doppler W Color    Narrative  Transthoracic Echocardiography Report (TTE)    Demographics    Patient Name    FERN Esqueda Gender                Male    MR #            [id number]      Race                      Ethnicity    Account #       [de-identified]      Room Number    Accession       [id number]      Date of Study         02/12/2020  Number    Date of Birth   1945     Referring Physician   BRANDON Jung 74 year(s)     Sonographer           Thea Aquino [facility]    Interpreting          Melinda Munoz MD  Physician    Procedure    Type of Study    TTE procedure:ECHOCARDIOGRAM COMPLETE 2D W DOPPLER W COLOR. Procedure Date  Date: 02/12/2020 Start: 09:54 AM    Study Location: Echo Lab  Technical Quality: Limited visualization due to poor acoustical window. Indications:S/P percutaneous stent placement. Additional Medical History:Sleep apnea, shortness of breath, CAD with recent  stents, abnormal stress, family history of CAD    Patient Status: Routine    Height: 73 inches Weight: 251.01 pounds BSA: 2.37 m^2 BMI: 33.12 kg/m^2    BP: 121/77 mmHg    Allergies  - No Known Allergies. Conclusions    Summary  Ejection fraction was estimated at 60-65%. Ascending aorta = 3.0 cm. IVC size is within normal limits with normal respiratory phasic changes. Signature    ----------------------------------------------------------------  Electronically signed by Melinda Munoz MD (Interpreting  physician) on 02/13/2020 at 03:24 PM  ----------------------------------------------------------------    Findings    Mitral Valve  The mitral valve structure was normal with normal leaflet separation. DOPPLER: The transmitral velocity was within the normal range with no  evidence for mitral stenosis. There was no evidence of mitral  regurgitation. Aortic Valve  The aortic valve was trileaflet with normal thickness and cuspal  separation. DOPPLER: Transaortic velocity was within the normal range with  no evidence of aortic stenosis. There was no evidence of aortic  regurgitation. Tricuspid Valve  The tricuspid valve structure was normal with normal leaflet separation. DOPPLER: There was no evidence of tricuspid stenosis. There was no  evidence of tricuspid regurgitation. Pulmonic Valve  The pulmonic valve leaflets were not well seen.  DOPPLER: The transpulmonic  velocity was within the normal range with no AV DVI (Vmax):0.87    TR Gradient:20.07 mmHg  MV A' Septal Velocity:                           PV Peak Velocity: 94.7  14.6 cm/s                                        cm/s  MV E' Lateral Velocity:                          PV Peak Gradient: 3.59  9.7 cm/s                                         mmHg  MV A' Lateral Velocity:  13.6 cm/s  E/E' septal: 8.98  E/E' lateral: 5.74    [email or web address] [id number]       Assessment/Plan   LAD-Dx PCI 2/2020  EF 60-65%, NYHA I  HTN   HLD  Cholesterol, TG, LDL improved. Working on diet. Monitor BP, but will increase Imdur to 120 mg ER q day--goal is < 130/80. Continue all other current meds, no ADL limitations. No angina. No volume on exam.  Discussed diet/exercise/BP/weight loss/health lifestyle choices/lipids; the patient understands the goals and will try to comply.       Disposition:  1 year           Electronically signed by Grayson Carolina MD   7/21/2022 at 8:44 AM EDT

## 2022-08-10 RX ORDER — ICOSAPENT ETHYL 1000 MG/1
CAPSULE ORAL
Qty: 120 CAPSULE | Refills: 2 | Status: SHIPPED | OUTPATIENT
Start: 2022-08-10

## 2022-10-03 RX ORDER — METOPROLOL SUCCINATE 25 MG/1
TABLET, EXTENDED RELEASE ORAL
Qty: 90 TABLET | Refills: 2 | Status: SHIPPED | OUTPATIENT
Start: 2022-10-03

## 2022-10-27 ENCOUNTER — HOSPITAL ENCOUNTER (OUTPATIENT)
Dept: AUDIOLOGY | Age: 77
Discharge: HOME | End: 2022-10-27
Payer: MEDICARE

## 2022-10-27 PROCEDURE — 92567 TYMPANOMETRY: CPT | Performed by: AUDIOLOGIST

## 2022-10-27 PROCEDURE — 92557 COMPREHENSIVE HEARING TEST: CPT | Performed by: AUDIOLOGIST

## 2022-10-27 NOTE — PROGRESS NOTES
AUDIOLOGICAL EVALUATION      REASON FOR TESTING:  Audiometric evaluation per the request of [facility] APRTEGAN, due to the diagnosis of hearing loss, unspecified. The patient reports gradual hearing loss in both ears. He experiences slight tinnitus on occasion. He denies any otalgia, aural pressure and vertigo. Mr. Renetta Sauceda reports a history of occupational noise exposure as a  and a history of recreational noise exposure at the shooting range. He does not wear hearing protection devices. OTOSCOPY: WNL for both ears. 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 Word recognition ability is excellent at 100% for the left ear and good at 88% for the right ear. Tympanometry revealed normal peak pressure and normal middle ear compliance for both ears. RECOMMENDATION(S):   1- Today's results were reviewed with Nishant Hood and binaural amplification is recommended. Hearing aid information was given to the patient. He will contact our facility if he decides to pursue hearing aids. 2- Annual audiometry for monitoring purposes or sooner if any changes are noted in hearing ability.

## 2022-11-01 RX ORDER — TICAGRELOR 90 MG/1
TABLET ORAL
Qty: 60 TABLET | Refills: 9 | Status: SHIPPED | OUTPATIENT
Start: 2022-11-01

## 2023-01-12 ENCOUNTER — HOSPITAL ENCOUNTER (OUTPATIENT)
Age: 78
Discharge: HOME | End: 2023-01-12
Payer: MEDICARE

## 2023-01-12 ENCOUNTER — HOSPITAL ENCOUNTER (OUTPATIENT)
Dept: GENERAL RADIOLOGY | Age: 78
Discharge: HOME | End: 2023-01-12
Payer: MEDICARE

## 2023-01-12 DIAGNOSIS — N20.0 NEPHROLITHIASIS: ICD-10-CM

## 2023-01-12 PROCEDURE — 74018 RADEX ABDOMEN 1 VIEW: CPT

## 2023-01-19 ENCOUNTER — OFFICE VISIT (OUTPATIENT)
Dept: UROLOGY | Age: 78
End: 2023-01-19
Payer: MEDICARE

## 2023-01-19 VITALS — RESPIRATION RATE: 18 BRPM | WEIGHT: 250 LBS | HEIGHT: 73 IN | BODY MASS INDEX: 33.13 KG/M2

## 2023-01-19 DIAGNOSIS — N13.8 BPH WITH OBSTRUCTION/LOWER URINARY TRACT SYMPTOMS: Primary | ICD-10-CM

## 2023-01-19 DIAGNOSIS — N40.1 BPH WITH OBSTRUCTION/LOWER URINARY TRACT SYMPTOMS: Primary | ICD-10-CM

## 2023-01-19 DIAGNOSIS — N20.0 NEPHROLITHIASIS: ICD-10-CM

## 2023-01-19 DIAGNOSIS — R31.21 ASYMPTOMATIC MICROSCOPIC HEMATURIA: ICD-10-CM

## 2023-01-19 PROCEDURE — 99214 OFFICE O/P EST MOD 30 MIN: CPT | Performed by: UROLOGY

## 2023-01-19 PROCEDURE — G8417 CALC BMI ABV UP PARAM F/U: HCPCS | Performed by: UROLOGY

## 2023-01-19 PROCEDURE — G8484 FLU IMMUNIZE NO ADMIN: HCPCS | Performed by: UROLOGY

## 2023-01-19 PROCEDURE — G8427 DOCREV CUR MEDS BY ELIG CLIN: HCPCS | Performed by: UROLOGY

## 2023-01-19 PROCEDURE — 1036F TOBACCO NON-USER: CPT | Performed by: UROLOGY

## 2023-01-19 PROCEDURE — 1123F ACP DISCUSS/DSCN MKR DOCD: CPT | Performed by: UROLOGY

## 2023-01-19 RX ORDER — OXYBUTYNIN CHLORIDE 5 MG/1
5 TABLET, EXTENDED RELEASE ORAL DAILY
Qty: 90 TABLET | Refills: 3 | Status: SHIPPED | OUTPATIENT
Start: 2023-01-19 | End: 2023-04-19

## 2023-01-19 RX ORDER — TAMSULOSIN HYDROCHLORIDE 0.4 MG/1
0.4 CAPSULE ORAL DAILY
Qty: 90 CAPSULE | Refills: 3 | Status: SHIPPED | OUTPATIENT
Start: 2023-01-19 | End: 2023-04-19

## 2023-01-19 NOTE — PROGRESS NOTES
Obinna Abarca MD   Urology Clinic office Visit      Patient:  Jorge Luis Jackson  YOB: 1945  Date: 1/19/2023    HISTORY OF PRESENT ILLNESS:   The patient is a 65 y.o. male who presents today for evaluation of the following problem(s):      1. BPH with obstruction/lower urinary tract symptoms    2. Nephrolithiasis    3. Asymptomatic microscopic hematuria             Overall the problem(s) : are improving  Associated Symptoms: No dysuria, gross hematuria. Pain Severity:      Summary of old records: remote hx of kidney stone, passed it  Cystoscopy 7/2021  Prostate: Complete obstruction by lateral lobe of prostate. Bladder: No tumors or CIS noted. Small dome bladder diverticulum. Moderate trabeculation noted. (Patient's old records, notes and chart reviewed and summarized above.)      Onset years  Severity is described as moderate. The course of symptoms over time is improving  Lower urinary tract symptoms: frequency and decreased urinary stream, urgency  UA reviewed and shows trace hgb  No gross heamturia  No ED  Former smoker  Drinks lots of coffee  PSA 5/2021 0.69    meds are working for him well; no hematuria, no UTIs    I independently reviewed and verified the images and reports from:    XR ABDOMEN (KUB) (SINGLE AP VIEW)    Result Date: 1/12/2023  PROCEDURE: XR ABDOMEN (KUB) (SINGLE AP VIEW) CLINICAL INFORMATION: Nephrolithiasis COMPARISON: 6/3/2021 TECHNIQUE: 4 supine images of the abdomen were obtained. FINDINGS: Intestinal gas pattern is normal. I see no free air. No mass lesion is seen. Kidneys are somewhat obscured. . No definite renal or ureteral calculi are seen. Moderate degenerative changes lower lumbar spine. Mild degenerative changes both hips. No acute findings. No renal calculi seen.          Last several PSA's:  Lab Results   Component Value Date    PSA 0.69 05/08/2021       Last total testosterone:  No results found for: TESTOSTERONE    Urinalysis today:  No results found for this visit on 01/19/23. Last BUN and creatinine:  Lab Results   Component Value Date    BUN 15 05/08/2021     Lab Results   Component Value Date    CREATININE 1.0 05/08/2021       Imaging Reviewed during this Office Visit:   (results were independently reviewed by physician and radiology report verified)    PAST MEDICAL, FAMILY AND SOCIAL HISTORY:  Past Medical History:   Diagnosis Date    Cancer ([address]     skin cancer    History of kidney stones     Hyperlipidemia     Hypertension      Past Surgical History:   Procedure Laterality Date    COLONOSCOPY  01/2021    1 year repeat. removed 5 polyps (benign)     CORONARY ANGIOPLASTY WITH STENT PLACEMENT  2020     Family History   Problem Relation Age of Onset    Diabetes Mother     Heart Disease Father     Heart Disease Brother      Outpatient Medications Marked as Taking for the 1/19/23 encounter (Office Visit) with Jaleel Dunlap MD   Medication Sig Dispense Refill    oxybutynin (DITROPAN XL) 5 MG extended release tablet Take 1 tablet by mouth daily 90 tablet 3    tamsulosin (FLOMAX) 0.4 MG capsule Take 1 capsule by mouth daily 90 capsule 3    BRILINTA 90 MG TABS tablet TAKE ONE TABLET BY MOUTH TWICE A DAY 60 tablet 9    metoprolol succinate (TOPROL XL) 25 MG extended release tablet TAKE ONE TABLET BY MOUTH EVERY DAY 90 tablet 2    VASCEPA 1 g CAPS capsule TAKE 2 CAPSULES BY MOUTH TWICE A  capsule 2    nitroGLYCERIN (NITROSTAT) 0.4 MG SL tablet up to max of 3 total doses. If no relief after 1 dose, call 911. 25 tablet 3    isosorbide mononitrate (IMDUR) 120 MG extended release tablet Take 1 tablet by mouth in the morning.  90 tablet 3    VITAMIN E PO Take by mouth      ZINC PO Take by mouth      Cyanocobalamin (VITAMIN B12 PO) Take by mouth      Ubiquinol 100 MG CAPS Take by mouth      Orange Peel (D-LIMONENE PO) Take by mouth      NONFORMULARY Cognitex Elite      NONFORMULARY HERBAL SUPPLEMENTS: Vascular support black nakia, ultra prostate formula, cognitex elite,      rosuvastatin (CRESTOR) 40 MG tablet Take 1 tablet by mouth daily 60 tablet 3    Multiple Vitamins-Minerals (PRESERVISION AREDS 2 PO) Take 1 tablet by mouth daily       magnesium (MAGNESIUM-OXIDE) 250 MG TABS tablet Take 250 mg by mouth daily      lisinopril (PRINIVIL;ZESTRIL) 40 MG tablet New increased dose one daily (Patient taking differently: Take 40 mg by mouth daily) 90 tablet 3    KRILL OIL PO Take  by mouth daily. Cholecalciferol (VITAMIN D) 2000 UNITS CAPS capsule Take  by mouth daily. ascorbic acid (VITAMIN C) 500 MG tablet Take 500 mg by mouth daily. Coenzyme Q10 (CO Q 10 PO) Take  by mouth daily. aspirin EC 81 MG EC tablet Take 81 mg by mouth daily. UNABLE TO FIND Super Beta Prostate daily         Patient has no known allergies. Social History     Tobacco Use   Smoking Status Former    Packs/day: 1.00    Years: 3.00    Pack years: 3.00    Types: Cigarettes    Quit date:     Years since quittin.0   Smokeless Tobacco Never       Social History     Substance and Sexual Activity   Alcohol Use Not Currently    Alcohol/week: 4.0 standard drinks    Types: 4 Cans of beer per week       REVIEW OF SYSTEMS:  Constitutional: negative  Eyes: negative  Respiratory: negative  Cardiovascular: negative  Gastrointestinal: negative  Musculoskeletal: negative  Genitourinary: negative except for what is in HPI  Skin: negative   Neurological: negative  Hematological/Lymphatic: negative  Psychological: negative    Physical Exam:    This a 65 y.o. male   Vitals:    [phone]   Resp: 18     Constitutional: Patient in no acute distress; Neuro: alert and oriented to person place and time. Assessment and Plan      1. BPH with obstruction/lower urinary tract symptoms    2. Nephrolithiasis    3. Asymptomatic microscopic hematuria                   Medications working well for him  Flomax and Ditropan going well for him; will refill  Previous KUB- no stones. Recheck 1 year.    Follow up 1 year PSA and KUB        Prescriptions Ordered:  Orders Placed This Encounter   Medications    oxybutynin (DITROPAN XL) 5 MG extended release tablet     Sig: Take 1 tablet by mouth daily     Dispense:  90 tablet     Refill:  3    tamsulosin (FLOMAX) 0.4 MG capsule     Sig: Take 1 capsule by mouth daily     Dispense:  90 capsule     Refill:  3        Orders Placed:  Orders Placed This Encounter   Procedures    XR ABDOMEN (KUB) (SINGLE AP VIEW)     Standing Status:   Future     Standing Expiration Date:   7/19/2024    PSA, Diagnostic     Standing Status:   Future     Standing Expiration Date:   1/19/2024              MD Kathy Ayala M.D, MD [facility]

## 2023-02-10 RX ORDER — ISOSORBIDE MONONITRATE 60 MG/1
TABLET, EXTENDED RELEASE ORAL
Qty: 90 TABLET | Refills: 1 | Status: SHIPPED | OUTPATIENT
Start: 2023-02-10

## 2023-07-12 ENCOUNTER — HOSPITAL ENCOUNTER (OUTPATIENT)
Dept: NON INVASIVE DIAGNOSTICS | Age: 78
Discharge: HOME | End: 2023-07-12
Attending: NURSE PRACTITIONER
Payer: MEDICARE

## 2023-07-12 DIAGNOSIS — R06.02 SOB (SHORTNESS OF BREATH): ICD-10-CM

## 2023-07-12 LAB
LV EF: 53 %
LVEF MODALITY: NORMAL

## 2023-07-12 PROCEDURE — 3430000000 HC RX DIAGNOSTIC RADIOPHARMACEUTICAL: Performed by: NURSE PRACTITIONER

## 2023-07-12 PROCEDURE — 78452 HT MUSCLE IMAGE SPECT MULT: CPT | Performed by: INTERNAL MEDICINE

## 2023-07-12 PROCEDURE — 93017 CV STRESS TEST TRACING ONLY: CPT | Performed by: INTERNAL MEDICINE

## 2023-07-12 PROCEDURE — 93306 TTE W/DOPPLER COMPLETE: CPT

## 2023-07-12 PROCEDURE — 6360000002 HC RX W HCPCS

## 2023-07-12 PROCEDURE — A9500 TC99M SESTAMIBI: HCPCS | Performed by: NURSE PRACTITIONER

## 2023-07-12 RX ORDER — TETRAKIS(2-METHOXYISOBUTYLISOCYANIDE)COPPER(I) TETRAFLUOROBORATE 1 MG/ML
32.1 INJECTION, POWDER, LYOPHILIZED, FOR SOLUTION INTRAVENOUS
Status: COMPLETED | OUTPATIENT
Start: 2023-07-12 | End: 2023-07-12

## 2023-07-12 RX ORDER — TETRAKIS(2-METHOXYISOBUTYLISOCYANIDE)COPPER(I) TETRAFLUOROBORATE 1 MG/ML
9.9 INJECTION, POWDER, LYOPHILIZED, FOR SOLUTION INTRAVENOUS
Status: COMPLETED | OUTPATIENT
Start: 2023-07-12 | End: 2023-07-12

## 2023-07-12 RX ADMIN — Medication 32.1 MILLICURIE: at 10:00

## 2023-07-12 RX ADMIN — Medication 9.9 MILLICURIE: at 08:44

## 2023-07-24 ENCOUNTER — OFFICE VISIT (OUTPATIENT)
Dept: CARDIOLOGY CLINIC | Age: 78
End: 2023-07-24
Payer: MEDICARE

## 2023-07-24 VITALS
HEIGHT: 73 IN | WEIGHT: 249.9 LBS | SYSTOLIC BLOOD PRESSURE: 145 MMHG | BODY MASS INDEX: 33.12 KG/M2 | DIASTOLIC BLOOD PRESSURE: 65 MMHG | HEART RATE: 71 BPM

## 2023-07-24 DIAGNOSIS — E78.5 HYPERLIPIDEMIA, UNSPECIFIED HYPERLIPIDEMIA TYPE: ICD-10-CM

## 2023-07-24 DIAGNOSIS — I25.10 CORONARY ARTERY DISEASE INVOLVING NATIVE CORONARY ARTERY OF NATIVE HEART WITHOUT ANGINA PECTORIS: ICD-10-CM

## 2023-07-24 DIAGNOSIS — I10 HYPERTENSION, UNSPECIFIED TYPE: Primary | ICD-10-CM

## 2023-07-24 DIAGNOSIS — I50.32 CHRONIC HEART FAILURE WITH PRESERVED EJECTION FRACTION (HCC): ICD-10-CM

## 2023-07-24 DIAGNOSIS — R53.82 CHRONIC FATIGUE: ICD-10-CM

## 2023-07-24 PROCEDURE — 3074F SYST BP LT 130 MM HG: CPT | Performed by: REGISTERED NURSE

## 2023-07-24 PROCEDURE — 3078F DIAST BP <80 MM HG: CPT | Performed by: REGISTERED NURSE

## 2023-07-24 PROCEDURE — 93000 ELECTROCARDIOGRAM COMPLETE: CPT | Performed by: NURSE PRACTITIONER

## 2023-07-24 PROCEDURE — G8417 CALC BMI ABV UP PARAM F/U: HCPCS | Performed by: REGISTERED NURSE

## 2023-07-24 PROCEDURE — 99213 OFFICE O/P EST LOW 20 MIN: CPT | Performed by: REGISTERED NURSE

## 2023-07-24 PROCEDURE — G8427 DOCREV CUR MEDS BY ELIG CLIN: HCPCS | Performed by: REGISTERED NURSE

## 2023-07-24 PROCEDURE — 1123F ACP DISCUSS/DSCN MKR DOCD: CPT | Performed by: REGISTERED NURSE

## 2023-07-24 PROCEDURE — 1036F TOBACCO NON-USER: CPT | Performed by: REGISTERED NURSE

## 2023-07-24 RX ORDER — NITROGLYCERIN 0.4 MG/1
TABLET SUBLINGUAL
Qty: 25 TABLET | Refills: 3 | Status: SHIPPED | OUTPATIENT
Start: 2023-07-24

## 2023-07-24 RX ORDER — METOPROLOL SUCCINATE 25 MG/1
25 TABLET, EXTENDED RELEASE ORAL 2 TIMES DAILY
Qty: 90 TABLET | Refills: 3 | Status: SHIPPED | OUTPATIENT
Start: 2023-07-24

## 2023-07-24 NOTE — PROGRESS NOTES
Pt C/O some fatigued but pt thinks its due to not using cpap      Pt denies CP, SOB, Headache, dizziness, heart palpitations, swelling,
pectoris  metoprolol succinate (TOPROL XL) 25 MG extended release tablet    nitroGLYCERIN (NITROSTAT) 0.4 MG SL tablet      4. Chronic heart failure with preserved ejection fraction (HCC)        5. Hyperlipidemia, unspecified hyperlipidemia type            Orders Placed This Encounter   Procedures    EKG 12 Lead     Standing Status:   Future     Number of Occurrences:   1     Standing Expiration Date:   7/23/2024     Order Specific Question:   Reason for Exam?     Answer: Other       BEAR Helms CNP    Preceptor attestation statement:  I have discussed and reviewed the above progress note including any patient assessment findings and diagnostic studies. The plan of care was reviewed and developed in conjunction with this writer.    BEAR Sanchez - CNP

## 2023-07-24 NOTE — PATIENT INSTRUCTIONS
We are increasing your metoprolol (toprol xL) to 25 mg TWICE a day. Take one pill in the morning and one pill in the evening. Continue other current medications as prescribed. Try to limit your sodium/salt intake to 2g per day. Use your CPAP machine as many nights as you are able. Wear compression stockings and elevate your legs when you are sitting to help with the ankle/feet swelling. Follow-up with your PCP as scheduled. Follow-up with Dr. Madeleine Hathaway, or Elidia Hutton CNP  in 1 year as scheduled or sooner if need.

## 2023-10-10 ENCOUNTER — HOSPITAL ENCOUNTER (OUTPATIENT)
Age: 78
Discharge: HOME | End: 2023-10-10
Payer: MEDICARE

## 2023-10-10 PROCEDURE — 36415 COLL VENOUS BLD VENIPUNCTURE: CPT

## 2023-10-10 PROCEDURE — 85610 PROTHROMBIN TIME: CPT

## 2023-10-10 PROCEDURE — 85027 COMPLETE CBC AUTOMATED: CPT

## 2023-10-10 PROCEDURE — 80048 BASIC METABOLIC PNL TOTAL CA: CPT

## 2023-10-10 PROCEDURE — 85730 THROMBOPLASTIN TIME PARTIAL: CPT

## 2023-10-11 LAB
ANION GAP SERPL CALC-SCNC: 14 MEQ/L (ref 8–16)
APTT PPP: 29.4 SECONDS (ref 22–38)
BUN SERPL-MCNC: 14 MG/DL (ref 7–22)
CALCIUM SERPL-MCNC: 9.5 MG/DL (ref 8.5–10.5)
CHLORIDE SERPL-SCNC: 106 MEQ/L (ref 98–111)
CO2 SERPL-SCNC: 25 MEQ/L (ref 23–33)
CREAT SERPL-MCNC: 1.2 MG/DL (ref 0.4–1.2)
DEPRECATED RDW RBC AUTO: 47.9 FL (ref 35–45)
ERYTHROCYTE [DISTWIDTH] IN BLOOD BY AUTOMATED COUNT: 13 % (ref 11.5–14.5)
GFR SERPL CREATININE-BSD FRML MDRD: > 60 ML/MIN/1.73M2
GLUCOSE SERPL-MCNC: 111 MG/DL (ref 70–108)
HCT VFR BLD AUTO: 48.9 % (ref 42–52)
HGB BLD-MCNC: 15.9 GM/DL (ref 14–18)
INR PPP: 0.92 (ref 0.85–1.13)
MCH RBC QN AUTO: 32.4 PG (ref 26–33)
MCHC RBC AUTO-ENTMCNC: 32.5 GM/DL (ref 32.2–35.5)
MCV RBC AUTO: 99.8 FL (ref 80–94)
PLATELET # BLD AUTO: 200 THOU/MM3 (ref 130–400)
PMV BLD AUTO: 10.2 FL (ref 9.4–12.4)
POTASSIUM SERPL-SCNC: 4.2 MEQ/L (ref 3.5–5.2)
RBC # BLD AUTO: 4.9 MILL/MM3 (ref 4.7–6.1)
SODIUM SERPL-SCNC: 145 MEQ/L (ref 135–145)
WBC # BLD AUTO: 6.2 THOU/MM3 (ref 4.8–10.8)

## 2023-10-18 NOTE — PROGRESS NOTES
EKG 7/24/23 given to anesthesia for review.  Per Dr. Ekta Saavedra ok to proceed at the surgery center without further intervention

## 2023-10-18 NOTE — PROGRESS NOTES
PAT call attempted, patient unavailable, left message to please call us back at your earliest convenience; [phone]

## 2023-10-19 NOTE — PROGRESS NOTES
NPO after midnight  [employer] and drivers license  Wear comfortable clean clothing  Do not bring jewelry  Shower night before and morning of surgery with a liquid antibacterial soap  Bring list of medications with dosage and how often taken  Follow all instructions given by your physician   needed at discharge  Please limit to 2 visitors for surgery  You must have a responsible adult with you day of surgery and for 24 hours after surgery  Call [phone] for any questions

## 2023-10-25 ENCOUNTER — ANESTHESIA EVENT (OUTPATIENT)
Dept: OPERATING ROOM | Age: 78
End: 2023-10-25
Payer: MEDICARE

## 2023-10-25 ENCOUNTER — ANESTHESIA (OUTPATIENT)
Dept: OPERATING ROOM | Age: 78
End: 2023-10-25
Payer: MEDICARE

## 2023-10-25 ENCOUNTER — HOSPITAL ENCOUNTER (OUTPATIENT)
Age: 78
Setting detail: OUTPATIENT SURGERY
Discharge: HOME | End: 2023-10-25
Attending: SPECIALIST | Admitting: SPECIALIST
Payer: MEDICARE

## 2023-10-25 VITALS
SYSTOLIC BLOOD PRESSURE: 106 MMHG | TEMPERATURE: 96.9 F | BODY MASS INDEX: 33.4 KG/M2 | WEIGHT: 252 LBS | OXYGEN SATURATION: 94 % | HEART RATE: 66 BPM | HEIGHT: 73 IN | DIASTOLIC BLOOD PRESSURE: 62 MMHG | RESPIRATION RATE: 14 BRPM

## 2023-10-25 DIAGNOSIS — C44.02 SQUAMOUS CELL CARCINOMA OF LIP: Primary | ICD-10-CM

## 2023-10-25 PROCEDURE — 6360000002 HC RX W HCPCS: Performed by: NURSE ANESTHETIST, CERTIFIED REGISTERED

## 2023-10-25 PROCEDURE — 2580000003 HC RX 258: Performed by: NURSE ANESTHETIST, CERTIFIED REGISTERED

## 2023-10-25 PROCEDURE — 2709999900 HC NON-CHARGEABLE SUPPLY: Performed by: SPECIALIST

## 2023-10-25 PROCEDURE — 7100000010 HC PHASE II RECOVERY - FIRST 15 MIN: Performed by: SPECIALIST

## 2023-10-25 PROCEDURE — 2500000003 HC RX 250 WO HCPCS: Performed by: SPECIALIST

## 2023-10-25 PROCEDURE — 3600000002 HC SURGERY LEVEL 2 BASE: Performed by: SPECIALIST

## 2023-10-25 PROCEDURE — 3600000012 HC SURGERY LEVEL 2 ADDTL 15MIN: Performed by: SPECIALIST

## 2023-10-25 PROCEDURE — 3700000001 HC ADD 15 MINUTES (ANESTHESIA): Performed by: SPECIALIST

## 2023-10-25 PROCEDURE — 3700000000 HC ANESTHESIA ATTENDED CARE: Performed by: SPECIALIST

## 2023-10-25 PROCEDURE — 7100000011 HC PHASE II RECOVERY - ADDTL 15 MIN: Performed by: SPECIALIST

## 2023-10-25 RX ORDER — PROPOFOL 10 MG/ML
INJECTION, EMULSION INTRAVENOUS PRN
Status: DISCONTINUED | OUTPATIENT
Start: 2023-10-25 | End: 2023-10-25 | Stop reason: SDUPTHER

## 2023-10-25 RX ORDER — VASOPRESSIN 20 U/ML
INJECTION PARENTERAL PRN
Status: DISCONTINUED | OUTPATIENT
Start: 2023-10-25 | End: 2023-10-25 | Stop reason: SDUPTHER

## 2023-10-25 RX ORDER — PHENYLEPHRINE HCL IN 0.9% NACL 1 MG/10 ML
SYRINGE (ML) INTRAVENOUS PRN
Status: DISCONTINUED | OUTPATIENT
Start: 2023-10-25 | End: 2023-10-25 | Stop reason: SDUPTHER

## 2023-10-25 RX ORDER — SODIUM CHLORIDE 9 MG/ML
INJECTION, SOLUTION INTRAVENOUS CONTINUOUS PRN
Status: DISCONTINUED | OUTPATIENT
Start: 2023-10-25 | End: 2023-10-25 | Stop reason: SDUPTHER

## 2023-10-25 RX ORDER — HYDROCODONE BITARTRATE AND ACETAMINOPHEN 5; 325 MG/1; MG/1
1 TABLET ORAL EVERY 6 HOURS PRN
Qty: 10 TABLET | Refills: 0 | Status: SHIPPED | OUTPATIENT
Start: 2023-10-25 | End: 2023-10-28

## 2023-10-25 RX ORDER — LIDOCAINE HYDROCHLORIDE AND EPINEPHRINE 10; 10 MG/ML; UG/ML
INJECTION, SOLUTION INFILTRATION; PERINEURAL PRN
Status: DISCONTINUED | OUTPATIENT
Start: 2023-10-25 | End: 2023-10-25 | Stop reason: ALTCHOICE

## 2023-10-25 RX ORDER — CEFAZOLIN SODIUM 1 G/3ML
INJECTION, POWDER, FOR SOLUTION INTRAMUSCULAR; INTRAVENOUS PRN
Status: DISCONTINUED | OUTPATIENT
Start: 2023-10-25 | End: 2023-10-25 | Stop reason: SDUPTHER

## 2023-10-25 RX ADMIN — Medication 200 MCG: at 14:38

## 2023-10-25 RX ADMIN — VASOPRESSIN 2 UNITS: 20 INJECTION INTRAVENOUS at 14:51

## 2023-10-25 RX ADMIN — PROPOFOL 50 MG: 10 INJECTION, EMULSION INTRAVENOUS at 14:28

## 2023-10-25 RX ADMIN — PROPOFOL 100 MCG/KG/MIN: 10 INJECTION, EMULSION INTRAVENOUS at 14:30

## 2023-10-25 RX ADMIN — VASOPRESSIN 4 UNITS: 20 INJECTION INTRAVENOUS at 14:48

## 2023-10-25 RX ADMIN — Medication 200 MCG: at 14:43

## 2023-10-25 RX ADMIN — CEFAZOLIN 2 G: 1 INJECTION, POWDER, FOR SOLUTION INTRAMUSCULAR; INTRAVENOUS at 14:35

## 2023-10-25 RX ADMIN — PROPOFOL 50 MG: 10 INJECTION, EMULSION INTRAVENOUS at 14:23

## 2023-10-25 RX ADMIN — SODIUM CHLORIDE: 9 INJECTION, SOLUTION INTRAVENOUS at 14:21

## 2023-10-25 ASSESSMENT — PAIN - FUNCTIONAL ASSESSMENT: PAIN_FUNCTIONAL_ASSESSMENT: NONE - DENIES PAIN

## 2023-10-25 ASSESSMENT — ENCOUNTER SYMPTOMS: SHORTNESS OF BREATH: 1

## 2023-10-25 NOTE — PROGRESS NOTES
1530: Iv removed, patient getting dressed, wife at bedside. 1545: Discharge instructions reviewed with patient and family member. All questions were addressed and answered. Patient and family member verbalized understanding of discharge plan. 1600: patient ambulated to discharge lobby in stable condition. Patient discharged home with wife.

## 2023-10-25 NOTE — OP NOTE
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 The area was then prepped and draped in the standard surgical fashion. There was a 5cm2 complex defect that extended from the anterior left lower lip skin across the vermilion border and dry mucosa to dry mucosa. This could not be closed primarily due to size and complexity of defect. Therefore full thickness excision was performed of over 1/2 the vertical height of the lip. The labial arteries were ligated and then the complex cheiloplasty was repaired in layers with 4-0 Monocryl suture placed in interrupted buried fashion after accurately aligning the vermilion border under loupe magnification. Surgicel was placed internally to the lip to help with hemostasis as the patient is actively on blood thinners and the final mucosa closure was with 4-0 chromic while the anterior lip skin was closed with 4-0 Monocryl suture placed in interrupted deep dermal fashion followed by 5-0 fast absorbing suture. Bacitracin was applied as final dressing. The patient tolerated the procedure quite well and remained hemodynamically stable throughout the procedure and was quite comfortable throughout the operative course.     Clinical staging for

## 2023-10-25 NOTE — DISCHARGE INSTRUCTIONS
POST OPERATIVE INSTRUCTION SHEET  SKIN TUMOR/LESION REMOVAL          Activity:    No strenuous activity for 48 hours  No activity that stresses the suture closure/incision  Regular diet; Unless operation of lip- then clear liquids for 48 hours (Sip from a cup: do not use a straw)  ABSOLUTELY NO NICOTINE OF ANY TYPE    Wound Care: The incision is open to air, you should gently wash with soap and water using fingertips then apply Bacitracin 3 times a day for 4 days to the portion of incision below the lip. DO NOT USE BACITRACIN LONGER THAN FOUR DAYS. Recommend sleeping in a recliner or with head elevated for next 2-3 nights. Apply cold compress over next 36 hours. Do not place directly on skin. Do not leave on greater than 20 minutes at a time. Keep all incisions clean  You may shower 36 hours after the operation. Use fingertips only when washing around incision, no washcloth. Pat dry. Limitations:  No swimming, hot tub, sauna or soaking in a bathtub    Prescriptions: Take exactly as prescribed    Follow-Up:  Nov 30, 2023 @ 11 am with GARRETT Mosquera      Notify our office if you experience any of the following:   Develop a fever (temperature is greater than 100.5F)   Develop redness greater than 1 cm around incision or red streaks up extremity   Have any excess bleeding/ increased drainage or swelling at the incision site    *Begin using Peridex mouthwash tonight  *A prescription for [place] has been sent to your pharmacy. Do not drink alcohol while using Norco  *You may resume Brilinta and aspirin on Friday, if held preoperatively and if medically able to hold that long. How Do you Prevent Surgical Site Infections? *HAND WASHING     *STOP SHAVING   Wash your hands multiple times daily  Do not shave incisional area 48 hours before   with soap for 20 seconds. or until 2 weeks after surgery. This can   Before eating and wound care.    cause tiny cuts in the skin which can lead to

## 2023-10-25 NOTE — PROGRESS NOTES
1515: Pt arrived to Phase II Recovery via cart. Awake and alert with wife present in room. Report received from Cindy, [address] [place] [address]. VSS. Incision to lower lip clean, dry and intact with sutures. Patient denies pain. Ice water provided and patient tolerated well. IV capped. Site clean, dry and intact. Call light in reach. 1530: Report given to Mike Stanley RN.

## 2023-10-26 NOTE — ANESTHESIA POSTPROCEDURE EVALUATION
Department of Anesthesiology  Postprocedure Note    Patient: Megan Liang  MRN: [id number]  YOB: 1945  Date of evaluation: 10/26/2023      Procedure Summary     Date: 10/25/23 Room / Location: [facility] 04 / [address] [facility]    Anesthesia Start: 7615 Anesthesia Stop: 9997    Procedure: MOHS Repair SCC Left Lower Lip (Left: Face) Diagnosis:       SCC (squamous cell carcinoma), lip      (SCC (squamous cell carcinoma), lip [C44.02])    Surgeons: Nuria Singh MD Responsible Provider: Jeff Bender DO    Anesthesia Type: MAC ASA Status: 3          Anesthesia Type: No value filed.     Edna Phase I:      Edna Phase II: Edna Score: 10      Anesthesia Post Evaluation    Patient location during evaluation: bedside  Patient participation: complete - patient participated  Level of consciousness: awake  Airway patency: patent  Nausea & Vomiting: no nausea  Complications: no  Cardiovascular status: hemodynamically stable  Respiratory status: acceptable  Hydration status: stable  Pain management: adequate

## 2023-11-08 RX ORDER — TICAGRELOR 90 MG/1
TABLET ORAL
Qty: 60 TABLET | Refills: 9 | Status: SHIPPED | OUTPATIENT
Start: 2023-11-08

## 2024-01-09 ENCOUNTER — HOSPITAL ENCOUNTER (OUTPATIENT)
Dept: GENERAL RADIOLOGY | Age: 79
Discharge: HOME | End: 2024-01-09
Payer: MEDICARE

## 2024-01-09 ENCOUNTER — HOSPITAL ENCOUNTER (OUTPATIENT)
Age: 79
Discharge: HOME | End: 2024-01-09
Payer: MEDICARE

## 2024-01-09 DIAGNOSIS — N40.1 BPH WITH OBSTRUCTION/LOWER URINARY TRACT SYMPTOMS: ICD-10-CM

## 2024-01-09 DIAGNOSIS — N13.8 BPH WITH OBSTRUCTION/LOWER URINARY TRACT SYMPTOMS: ICD-10-CM

## 2024-01-09 DIAGNOSIS — N20.0 NEPHROLITHIASIS: ICD-10-CM

## 2024-01-09 LAB — PSA SERPL-MCNC: 0.89 NG/ML (ref 0–1)

## 2024-01-09 PROCEDURE — 36415 COLL VENOUS BLD VENIPUNCTURE: CPT

## 2024-01-09 PROCEDURE — 74018 RADEX ABDOMEN 1 VIEW: CPT

## 2024-01-09 PROCEDURE — 84153 ASSAY OF PSA TOTAL: CPT

## 2024-01-17 ENCOUNTER — OFFICE VISIT (OUTPATIENT)
Dept: UROLOGY | Age: 79
End: 2024-01-17
Payer: MEDICARE

## 2024-01-17 VITALS — WEIGHT: 252.3 LBS | HEIGHT: 73 IN | RESPIRATION RATE: 16 BRPM | BODY MASS INDEX: 33.44 KG/M2

## 2024-01-17 DIAGNOSIS — N13.8 BPH WITH OBSTRUCTION/LOWER URINARY TRACT SYMPTOMS: Primary | ICD-10-CM

## 2024-01-17 DIAGNOSIS — Z87.442 HISTORY OF KIDNEY STONES: ICD-10-CM

## 2024-01-17 DIAGNOSIS — N40.1 BPH WITH OBSTRUCTION/LOWER URINARY TRACT SYMPTOMS: Primary | ICD-10-CM

## 2024-01-17 DIAGNOSIS — R35.0 URINARY FREQUENCY: ICD-10-CM

## 2024-01-17 LAB
BILIRUBIN URINE: NEGATIVE
BLOOD URINE, POC: NEGATIVE
CHARACTER, URINE: CLEAR
COLOR, URINE: YELLOW
GLUCOSE URINE: NEGATIVE MG/DL
KETONES, URINE: NEGATIVE
LEUKOCYTE CLUMPS, URINE: NEGATIVE
NITRITE, URINE: NEGATIVE
PH, URINE: 6 (ref 5–9)
PROTEIN, URINE: NEGATIVE MG/DL
SPECIFIC GRAVITY, URINE: 1.02 (ref 1–1.03)
UROBILINOGEN, URINE: 0.2 EU/DL (ref 0–1)

## 2024-01-17 PROCEDURE — 1036F TOBACCO NON-USER: CPT

## 2024-01-17 PROCEDURE — 81003 URINALYSIS AUTO W/O SCOPE: CPT

## 2024-01-17 PROCEDURE — 1123F ACP DISCUSS/DSCN MKR DOCD: CPT

## 2024-01-17 PROCEDURE — G8427 DOCREV CUR MEDS BY ELIG CLIN: HCPCS

## 2024-01-17 PROCEDURE — G8484 FLU IMMUNIZE NO ADMIN: HCPCS

## 2024-01-17 PROCEDURE — 99213 OFFICE O/P EST LOW 20 MIN: CPT

## 2024-01-17 PROCEDURE — G8417 CALC BMI ABV UP PARAM F/U: HCPCS

## 2024-01-17 RX ORDER — TAMSULOSIN HYDROCHLORIDE 0.4 MG/1
0.4 CAPSULE ORAL DAILY
Qty: 90 CAPSULE | Refills: 3 | Status: SHIPPED | OUTPATIENT
Start: 2024-01-17 | End: 2025-01-11

## 2024-01-17 RX ORDER — OXYBUTYNIN CHLORIDE 5 MG/1
5 TABLET, EXTENDED RELEASE ORAL DAILY
Qty: 90 TABLET | Refills: 3 | Status: SHIPPED | OUTPATIENT
Start: 2024-01-17 | End: 2025-01-11

## 2024-01-17 NOTE — PROGRESS NOTES
[facility] [place] [facility]  [facility] UROLOGY  [address]  [address]  [facility] [address]  Dept: [phone]  Loc: [phone]  Visit Date: 1/17/2024    HPI  Rodney Motta is a 78 y.o. male that presents to the urology clinic for BPH and OAB.    \"Emmanuel\" follows our office for BPH, OAB, and kidney stones. No recent kidney stone passage. Patient overall is doing very well on all fronts. BPH and OAB controlled on Flomax and Oxybutynin, respectively. IPSS improving vs prior visits at 15/3 today.    Most Recent PSA: 0.89  UA: Negative.  Lab Results   Component Value Date/Time    COLORU Yellow 01/17/2024 09:36 AM    COLORU Yellow 04/21/2021 12:00 AM    LABSPEC 1.020 01/17/2024 09:36 AM    LABPH 6.00 01/17/2024 09:36 AM    NITRU Negative 01/17/2024 09:36 AM    GLUCOSEU Negative 01/17/2024 09:36 AM    KETUA Negative 01/17/2024 09:36 AM    UROBILINOGEN 0.20 01/17/2024 09:36 AM    BILIRUBINUR Negative 01/17/2024 09:36 AM            I independently reviewed and verified the images and reports from:    XR ABDOMEN (KUB) (SINGLE AP VIEW)    Result Date: 1/9/2024  PROCEDURE: XR ABDOMEN (KUB) (SINGLE AP VIEW) CLINICAL INFORMATION: Nephrolithiasis COMPARISON: 1/12/2023 TECHNIQUE: 2 supine images of the abdomen were obtained. FINDINGS: Intestinal gas pattern is normal. I see no free air. No mass lesion is seen.  Kidneys are somewhat obscured.. No definite renal or ureteral calculi are seen. . Moderate degenerative changes scattered in the lumbar spine.     Normal supine abdomen. No acute findings. No renal calculi seen.    Final report electronically signed by Dr. Candido Newton on 1/9/2024 2:05 PM          Last BUN and creatinine:  Lab Results   Component Value Date    BUN 14 10/10/2023     Lab Results   Component Value Date    CREATININE 1.2 10/10/2023           PAST MEDICAL, FAMILY AND SOCIAL HISTORY UPDATE:  Past Medical History:   Diagnosis Date    Cancer (HCC)     skin cancer    History of kidney

## 2024-01-19 ENCOUNTER — HOSPITAL ENCOUNTER (OUTPATIENT)
Dept: AUDIOLOGY | Age: 79
Discharge: HOME | End: 2024-01-19
Payer: MEDICARE

## 2024-01-19 PROCEDURE — 92557 COMPREHENSIVE HEARING TEST: CPT | Performed by: AUDIOLOGIST

## 2024-01-19 NOTE — PROGRESS NOTES
AUDIOLOGICAL EVALUATION      REASON FOR TESTING:  Audiometric evaluation per the request of Shauna SIFUENTES, due to the diagnosis of bilateral hearing loss, unspecified type. The patient reports gradual hearing loss in both ears. He experiences slight tinnitus on occasion. He denies any otalgia, aural pressure and vertigo. Mr. Motta reports a history of occupational noise exposure as a  and a history of recreational noise exposure at the shooting range. He does not wear hearing protection devices. Previous audiogram, completed 10/27/22, showed mild sloping to severe sensorineural hearing loss for both ears. Word recognition ability was excellent at 100% for the left ear and good at 88% for the right ear. Tympanometry revealed normal peak pressure and normal middle ear compliance for both ears. Binaural amplification was recommended, but the patient was not interested in pursuing hearing aids at that time.    OTOSCOPY: WNL for both ears.     AUDIOGRAM        Reliability: Good    COMMENTS: Mild sloping to severe sensorineural hearing loss for both ears.  Word recognition ability is excellent at 92% for the left ear and good at 99% for the right ear. Thresholds are stable for both ears when compared to 10/27/22 audiogram.       RECOMMENDATION(S):   1- Binaural amplification is recommended. The patient would like to pursue hearing aids at this time. Discussed hearing aid options with the patient.  Reviewed styles and technology levels.  Decided on [employer]ivity[employer]o L70 RL KO hearing aids in P1 color with size 1M receivers.  Patient's hearing aid fitting is scheduled for 1/29/24.  2- Annual audiometry for monitoring purposes or sooner if any changes are noted in hearing ability.    AUDIOGRAM COMPLETED 10/27/22:

## 2024-01-29 ENCOUNTER — HOSPITAL ENCOUNTER (OUTPATIENT)
Dept: AUDIOLOGY | Age: 79
Discharge: HOME | End: 2024-01-29

## 2024-01-29 PROCEDURE — V5261 HEARING AID, DIGIT, BIN, BTE: HCPCS | Performed by: AUDIOLOGIST

## 2024-01-29 PROCEDURE — V5160 DISPENSING FEE BINAURAL: HCPCS | Performed by: AUDIOLOGIST

## 2024-01-29 NOTE — PROGRESS NOTES
[facility]#: [id number]   ACCT#: [id number]  PAYOR: SELF PAY- see signed pricing and policy below  PRIOR AUTH #: PA not required  Number of visits allowed: Unlimited while in warranty (until 2/17/27)  Charge for follow up visits: Follow up visits (out of warranty) are subject to current office visit charge.    DIAGNOSIS: Sensorineural hearing loss of both ears.    NEW HEARING AID FITTING:  [employer] L70 RL KO hearing aids were fit and dispensed for both ears. Explained care, use and insertion/removal.  Programmed. Did not pair the patient's hearing aids to his phone- may do so at the two week check. Instructed patient to download the [employer] dougie. Hearing aid fitting recheck scheduled for 2/12/24.        SPEECH MAPPING:    The [employer] real ear system was used to perform verification of Rodney's hearing aid fitting. The output of Rodney's binaural amplification was programmed to match appropriate NAL-NAL2 targets for MPO, soft, medium and loud stimuli utilizing speech mapping based on his 1/19/24 audiogram.    Speech mapping results suggest: appropriate outcomes with binaural amplification set to NAL-NAL2 targets. Patient preferred overall gain at 90%. Also, made adjustments due to some occlusion of own voice.

## 2024-02-12 ENCOUNTER — HOSPITAL ENCOUNTER (OUTPATIENT)
Dept: AUDIOLOGY | Age: 79
Discharge: HOME | End: 2024-02-12

## 2024-02-12 PROCEDURE — 9990000010 HC NO CHARGE VISIT: Performed by: AUDIOLOGIST

## 2024-02-12 NOTE — PROGRESS NOTES
TWO WEEK CHECK: The patient is doing well with the new hearing aids.  Rodney does not report any problems. Increased overall gain from 90% to 95%, per patient request. Paired the patient's hearing aids with his phone and with the [employer] dougie. Practiced appropriate use of bluetooth features. Reviewed wax filter and dome replacement. Scheduled annual hearing aid check for 1/20/25. Will see patient sooner if any problems arise.

## 2024-02-28 RX ORDER — ISOSORBIDE MONONITRATE 60 MG/1
TABLET, EXTENDED RELEASE ORAL
Qty: 90 TABLET | Refills: 1 | Status: SHIPPED | OUTPATIENT
Start: 2024-02-28

## 2024-04-10 ENCOUNTER — TELEPHONE (OUTPATIENT)
Dept: CARDIOLOGY CLINIC | Age: 79
End: 2024-04-10

## 2024-04-10 NOTE — TELEPHONE ENCOUNTER
Received fax from Ariana Zamora requesting all medical records for past 3 years. Sent request to medical records

## 2024-07-12 ENCOUNTER — HOSPITAL ENCOUNTER (OUTPATIENT)
Dept: AUDIOLOGY | Age: 79
Discharge: HOME | End: 2024-07-12

## 2024-07-12 PROCEDURE — 9990000010 HC NO CHARGE VISIT: Performed by: AUDIOLOGIST

## 2024-07-12 NOTE — PROGRESS NOTES
HEARING AID PROBLEM: Patient states that right hearing aid is working intermittently. Listening check revealed that left hearing aid was not functioning, which was resolved by replacing the wax filter. Right HA was working appropriately. Replaced the right HA wax filter as well. Replaced domes on both hearing aids. Brushed microphones. Increased overall gain to 100% per patient request. Showed patient how to adjust HA volume in the [employer] dougie.

## 2024-07-22 DIAGNOSIS — I25.10 CORONARY ARTERY DISEASE INVOLVING NATIVE CORONARY ARTERY OF NATIVE HEART WITHOUT ANGINA PECTORIS: ICD-10-CM

## 2024-07-22 DIAGNOSIS — I10 HYPERTENSION, UNSPECIFIED TYPE: ICD-10-CM

## 2024-07-22 RX ORDER — METOPROLOL SUCCINATE 25 MG/1
25 TABLET, EXTENDED RELEASE ORAL 2 TIMES DAILY
Qty: 90 TABLET | Refills: 0 | Status: SHIPPED | OUTPATIENT
Start: 2024-07-22

## 2024-07-22 NOTE — TELEPHONE ENCOUNTER
Rodney Motta called requesting a refill on the following medications:  Requested Prescriptions     Pending Prescriptions Disp Refills    metoprolol succinate (TOPROL XL) 25 MG extended release tablet 90 tablet 3     Sig: Take 1 tablet by mouth in the morning and at bedtime     Pharmacy verified:    [facility] - [place], [place] - [address] [place] [address] -  [phone] - F [phone]     Date of last visit: 07/24/2024  Date of next visit (if applicable): 8/22/2024

## 2024-08-22 ENCOUNTER — OFFICE VISIT (OUTPATIENT)
Dept: CARDIOLOGY CLINIC | Age: 79
End: 2024-08-22

## 2024-08-22 VITALS
HEIGHT: 73 IN | DIASTOLIC BLOOD PRESSURE: 82 MMHG | WEIGHT: 245 LBS | BODY MASS INDEX: 32.47 KG/M2 | SYSTOLIC BLOOD PRESSURE: 170 MMHG | HEART RATE: 80 BPM

## 2024-08-22 DIAGNOSIS — G47.33 OBSTRUCTIVE SLEEP APNEA ON CPAP: ICD-10-CM

## 2024-08-22 DIAGNOSIS — I25.10 CORONARY ARTERY DISEASE INVOLVING NATIVE CORONARY ARTERY OF NATIVE HEART WITHOUT ANGINA PECTORIS: Primary | ICD-10-CM

## 2024-08-22 DIAGNOSIS — E78.5 HYPERLIPIDEMIA, UNSPECIFIED HYPERLIPIDEMIA TYPE: ICD-10-CM

## 2024-08-22 DIAGNOSIS — I10 HYPERTENSION, UNSPECIFIED TYPE: ICD-10-CM

## 2024-08-22 RX ORDER — NITROGLYCERIN 0.4 MG/1
TABLET SUBLINGUAL
Qty: 25 TABLET | Refills: 3 | Status: SHIPPED | OUTPATIENT
Start: 2024-08-22

## 2024-08-22 RX ORDER — ICOSAPENT ETHYL 1 G/1
2 CAPSULE ORAL 2 TIMES DAILY
Qty: 360 CAPSULE | Refills: 3 | Status: SHIPPED | OUTPATIENT
Start: 2024-08-22

## 2024-08-22 RX ORDER — ROSUVASTATIN CALCIUM 40 MG/1
40 TABLET, COATED ORAL DAILY
Qty: 90 TABLET | Refills: 3 | Status: SHIPPED | OUTPATIENT
Start: 2024-08-22

## 2024-08-22 RX ORDER — METOPROLOL SUCCINATE 25 MG/1
25 TABLET, EXTENDED RELEASE ORAL 2 TIMES DAILY
Qty: 90 TABLET | Refills: 3 | Status: SHIPPED | OUTPATIENT
Start: 2024-08-22

## 2024-08-22 RX ORDER — ISOSORBIDE MONONITRATE 60 MG/1
60 TABLET, EXTENDED RELEASE ORAL DAILY
Qty: 90 TABLET | Refills: 3 | Status: SHIPPED | OUTPATIENT
Start: 2024-08-22

## 2024-08-22 NOTE — PATIENT INSTRUCTIONS
Continue current medications as prescribed.    Stay as active as you can.     Eat heart healthy diet.     Follow-up with your PCP as scheduled.    Follow-up with Dr. Trent or Nesha TAYLOR as scheduled or sooner if need.     You may receive a survey regarding the care you received during your visit.  Your input is valuable to us.  We encourage you to complete and return your survey.  We hope you will choose us in the future for your healthcare needs.

## 2024-08-22 NOTE — PROGRESS NOTES
1 year follow-up.   No concerns.     EKG completed.   
diagonal branch.  Again I did a three step kissing  inflation, once in the diagonal branch at high pressure, once in the LAD  at 20 atmospheres and then together in kissing fashion at nominal  pressure.  Thereafter, because this is the LAD system and the diagonal  system, I wanted to make sure that we had appropriately expanded the  stent and appropriately opened the struts.  I prepped a OCT catheter for  Evaluation of the anatomy.  I then removed all balloons from the patient, leaving  both wires in place and then passed the OCT down into the LAD.  OCT  confirmed that the stent was only expanded in the LAD and that the  diagonal branch stent was also well-expanded.  The bifurcation was  completely covered the stent and that the ostium of the diagonal branch  was preserved.  Given the findings, no further intervention was taken.   Both vessels demonstrated MEHRAN-3 flow without any perforation,  dissection, distal embolization, side branch loss, guide or  guidewire-induced trauma.     IMMEDIATE COMPLICATIONS:  None.     MEDICATIONS:  See EMR.     ACCESS:  Vasc Band was used for hemostasis.     ESTIMATED BLOOD LOSS:  Less than 50 mL.     SUMMARY:  Successful LAD-diagonal bifurcation stenting with OCT guidance.    Assessment/Plan      Preserved EF; EF 50-55% (60-65% per last ECHO in 2020); NYHA II  CAD- s/p PCI of LAD-diag bifurcation  in 2020  Recent lexiscan without ischemic changes  HLD-LDL 76; triglycerides improved: 141; PCP following lipid panel  HTN- remains hypertensive currently  Shortness of breath on exertion  ASHANTI-has CPAP- noncompliant      Doing well. No angina or evidence of decompensated HF, euvolemic on exam. Tolerating meds. No bleeding on DAPT.   No recent li[id assessment; on Vascepa and Crestor - will recheck - last done 2021  Cont Metoprolol/lisinopril/imdur/asa/brilinta/crestor/vascepa  Instructed to use CPAP regularly - admits not using - discussed and use encouraged - if not tolerating then needs

## 2024-10-08 ENCOUNTER — HOSPITAL ENCOUNTER (OUTPATIENT)
Dept: AUDIOLOGY | Age: 79
Discharge: HOME | End: 2024-10-08

## 2024-10-08 PROCEDURE — V5014 HEARING AID REPAIR/MODIFYING: HCPCS | Performed by: AUDIOLOGIST

## 2024-10-08 NOTE — PROGRESS NOTES
ACCOUNT #: [id number]    DIAGNOSIS: Sensorineural hearing loss of both ears.    HEARING AID /LOSS & DAMAGE CLAIM (RIGHT): Patient picked up replacement hearing aid for the right ear. Loaded previous session settings onto the hearing aid. Ran feedback test. Paired the patient's hearing aid to his cell phone. Deleted My [employer] dougie and reinstalled it- repaired hearing aids to [employer] dougie. Talked about \"find my hearing aid\" dougie. Instructed patient to search dougie store and then he will probably need to pair the hearing aids to that dougie. He did not know his Apple ID password today, so I was unable to help him with this. Replaced dome and wax filter on left hearing aid. Billed $250.00 for loss and damage claim.

## 2025-01-08 DIAGNOSIS — N40.1 BPH WITH OBSTRUCTION/LOWER URINARY TRACT SYMPTOMS: Primary | ICD-10-CM

## 2025-01-08 DIAGNOSIS — N13.8 BPH WITH OBSTRUCTION/LOWER URINARY TRACT SYMPTOMS: Primary | ICD-10-CM

## 2025-01-13 DIAGNOSIS — I25.10 CORONARY ARTERY DISEASE INVOLVING NATIVE CORONARY ARTERY OF NATIVE HEART WITHOUT ANGINA PECTORIS: ICD-10-CM

## 2025-01-13 RX ORDER — TICAGRELOR 90 MG/1
90 TABLET ORAL 2 TIMES DAILY
Qty: 60 TABLET | Refills: 7 | Status: SHIPPED | OUTPATIENT
Start: 2025-01-13

## 2025-01-15 ENCOUNTER — OFFICE VISIT (OUTPATIENT)
Dept: UROLOGY | Age: 80
End: 2025-01-15
Payer: MEDICARE

## 2025-01-15 VITALS
WEIGHT: 245.4 LBS | SYSTOLIC BLOOD PRESSURE: 122 MMHG | HEIGHT: 73 IN | DIASTOLIC BLOOD PRESSURE: 68 MMHG | BODY MASS INDEX: 32.52 KG/M2

## 2025-01-15 DIAGNOSIS — R35.0 URINARY FREQUENCY: ICD-10-CM

## 2025-01-15 DIAGNOSIS — Z87.442 HISTORY OF KIDNEY STONES: ICD-10-CM

## 2025-01-15 DIAGNOSIS — N13.8 BPH WITH OBSTRUCTION/LOWER URINARY TRACT SYMPTOMS: Primary | ICD-10-CM

## 2025-01-15 DIAGNOSIS — N40.1 BPH WITH OBSTRUCTION/LOWER URINARY TRACT SYMPTOMS: Primary | ICD-10-CM

## 2025-01-15 PROCEDURE — G8417 CALC BMI ABV UP PARAM F/U: HCPCS

## 2025-01-15 PROCEDURE — 99213 OFFICE O/P EST LOW 20 MIN: CPT

## 2025-01-15 PROCEDURE — 1159F MED LIST DOCD IN RCRD: CPT

## 2025-01-15 PROCEDURE — 1123F ACP DISCUSS/DSCN MKR DOCD: CPT

## 2025-01-15 PROCEDURE — 1036F TOBACCO NON-USER: CPT

## 2025-01-15 PROCEDURE — 3074F SYST BP LT 130 MM HG: CPT

## 2025-01-15 PROCEDURE — G8427 DOCREV CUR MEDS BY ELIG CLIN: HCPCS

## 2025-01-15 PROCEDURE — 3078F DIAST BP <80 MM HG: CPT

## 2025-01-15 RX ORDER — TAMSULOSIN HYDROCHLORIDE 0.4 MG/1
0.4 CAPSULE ORAL DAILY
Qty: 90 CAPSULE | Refills: 3 | Status: SHIPPED | OUTPATIENT
Start: 2025-01-15 | End: 2026-01-10

## 2025-01-15 RX ORDER — OXYBUTYNIN CHLORIDE 5 MG/1
5 TABLET, EXTENDED RELEASE ORAL DAILY
Qty: 90 TABLET | Refills: 3 | Status: SHIPPED | OUTPATIENT
Start: 2025-01-15 | End: 2026-01-10

## 2025-01-15 NOTE — PROGRESS NOTES
[facility] [place] [facility]  [facility] UROLOGY  [address]  [address]  [facility] [address]  Dept: [phone]  Loc: [phone]  Visit Date: 1/15/2025    HPI  Rodney Motta is a 79 y.o. male that presents to the urology clinic for BPH and OAB.    \"Emmanuel\" follows our office for BPH, OAB, and kidney stones. No recent kidney stone passage. Patient overall is doing very well on all fronts.     BPH and OAB controlled on Flomax and Oxybutynin, respectively. IPSS improving vs prior visits at 14/3 today.    Most Recent PSA: 0.84 (05/04/24)      I independently reviewed and verified the images and reports from:    XR ABDOMEN (KUB) (SINGLE AP VIEW)    Result Date: 1/9/2024  PROCEDURE: XR ABDOMEN (KUB) (SINGLE AP VIEW) CLINICAL INFORMATION: Nephrolithiasis COMPARISON: 1/12/2023 TECHNIQUE: 2 supine images of the abdomen were obtained. FINDINGS: Intestinal gas pattern is normal. I see no free air. No mass lesion is seen.  Kidneys are somewhat obscured.. No definite renal or ureteral calculi are seen. . Moderate degenerative changes scattered in the lumbar spine.     Normal supine abdomen. No acute findings. No renal calculi seen.    Final report electronically signed by Dr. Candido Newton on 1/9/2024 2:05 PM          Last BUN and creatinine:  Lab Results   Component Value Date    BUN 14 10/10/2023     Lab Results   Component Value Date    CREATININE 1.2 10/10/2023           PAST MEDICAL, FAMILY AND SOCIAL HISTORY UPDATE:  Past Medical History:   Diagnosis Date    Cancer (HCC)     skin cancer    History of kidney stones     Hyperlipidemia     Hypertension      Past Surgical History:   Procedure Laterality Date    COLONOSCOPY  01/2021    1 year repeat. removed 5 polyps (benign)     CORONARY ANGIOPLASTY WITH STENT PLACEMENT  2020    MOHS SURGERY Left 10/25/2023    MOHS Repair SCC Left Lower Lip performed by Terry Moctezuma MD at [facility] OR     Family History   Problem Relation Age of

## 2025-01-20 ENCOUNTER — HOSPITAL ENCOUNTER (OUTPATIENT)
Dept: AUDIOLOGY | Age: 80
Discharge: HOME | End: 2025-01-20

## 2025-01-20 PROCEDURE — 9990000010 HC NO CHARGE VISIT: Performed by: AUDIOLOGIST

## 2025-01-20 NOTE — PROGRESS NOTES
ANNUAL HEARING AID CHECK: Otoscopy was WNL for both ears.  Listening check of hearing aids revealed normal output. Replaced wax filters, medium closed domes and retention wires on both hearing aids. Brushed debris from microphone openings. Patient requested increased volume- increased overall gain to patient's satisfaction. Made some adjustments to G80 and low frequency gain due to occlusion after increasing the gain. Gave patient a brush and reminded him how to clean the microphone openings. Disconnected his hearing aids from his bluetooth with his phone, per patient request. Scheduled annual hearing aid check for 1/20/26.

## 2025-05-05 DIAGNOSIS — I10 HYPERTENSION, UNSPECIFIED TYPE: ICD-10-CM

## 2025-05-05 DIAGNOSIS — I25.10 CORONARY ARTERY DISEASE INVOLVING NATIVE CORONARY ARTERY OF NATIVE HEART WITHOUT ANGINA PECTORIS: ICD-10-CM

## 2025-05-05 RX ORDER — ISOSORBIDE MONONITRATE 60 MG/1
60 TABLET, EXTENDED RELEASE ORAL DAILY
Qty: 90 TABLET | Refills: 0 | Status: SHIPPED | OUTPATIENT
Start: 2025-05-05

## 2025-06-09 DIAGNOSIS — I25.10 CORONARY ARTERY DISEASE INVOLVING NATIVE CORONARY ARTERY OF NATIVE HEART WITHOUT ANGINA PECTORIS: ICD-10-CM

## 2025-06-09 DIAGNOSIS — I10 HYPERTENSION, UNSPECIFIED TYPE: ICD-10-CM

## 2025-06-10 RX ORDER — METOPROLOL SUCCINATE 25 MG/1
TABLET, EXTENDED RELEASE ORAL
Qty: 90 TABLET | Refills: 0 | Status: SHIPPED | OUTPATIENT
Start: 2025-06-10

## 2025-08-18 DIAGNOSIS — I10 HYPERTENSION, UNSPECIFIED TYPE: ICD-10-CM

## 2025-08-18 DIAGNOSIS — I25.10 CORONARY ARTERY DISEASE INVOLVING NATIVE CORONARY ARTERY OF NATIVE HEART WITHOUT ANGINA PECTORIS: ICD-10-CM

## 2025-08-19 RX ORDER — ISOSORBIDE MONONITRATE 60 MG/1
60 TABLET, EXTENDED RELEASE ORAL DAILY
Qty: 90 TABLET | Refills: 1 | Status: SHIPPED | OUTPATIENT
Start: 2025-08-19

## 2025-08-27 ENCOUNTER — OFFICE VISIT (OUTPATIENT)
Dept: CARDIOLOGY CLINIC | Age: 80
End: 2025-08-27
Payer: MEDICARE

## 2025-08-27 VITALS
BODY MASS INDEX: 32.1 KG/M2 | DIASTOLIC BLOOD PRESSURE: 78 MMHG | SYSTOLIC BLOOD PRESSURE: 160 MMHG | WEIGHT: 237 LBS | HEIGHT: 72 IN | HEART RATE: 60 BPM

## 2025-08-27 DIAGNOSIS — E78.5 DYSLIPIDEMIA: ICD-10-CM

## 2025-08-27 DIAGNOSIS — Z98.61 CAD S/P PERCUTANEOUS CORONARY ANGIOPLASTY: ICD-10-CM

## 2025-08-27 DIAGNOSIS — I25.10 CAD S/P PERCUTANEOUS CORONARY ANGIOPLASTY: ICD-10-CM

## 2025-08-27 DIAGNOSIS — I10 PRIMARY HYPERTENSION: Primary | ICD-10-CM

## 2025-08-27 PROCEDURE — 3078F DIAST BP <80 MM HG: CPT | Performed by: STUDENT IN AN ORGANIZED HEALTH CARE EDUCATION/TRAINING PROGRAM

## 2025-08-27 PROCEDURE — 99214 OFFICE O/P EST MOD 30 MIN: CPT | Performed by: STUDENT IN AN ORGANIZED HEALTH CARE EDUCATION/TRAINING PROGRAM

## 2025-08-27 PROCEDURE — 1123F ACP DISCUSS/DSCN MKR DOCD: CPT | Performed by: STUDENT IN AN ORGANIZED HEALTH CARE EDUCATION/TRAINING PROGRAM

## 2025-08-27 PROCEDURE — 93000 ELECTROCARDIOGRAM COMPLETE: CPT | Performed by: STUDENT IN AN ORGANIZED HEALTH CARE EDUCATION/TRAINING PROGRAM

## 2025-08-27 PROCEDURE — G8427 DOCREV CUR MEDS BY ELIG CLIN: HCPCS | Performed by: STUDENT IN AN ORGANIZED HEALTH CARE EDUCATION/TRAINING PROGRAM

## 2025-08-27 PROCEDURE — 1036F TOBACCO NON-USER: CPT | Performed by: STUDENT IN AN ORGANIZED HEALTH CARE EDUCATION/TRAINING PROGRAM

## 2025-08-27 PROCEDURE — 3077F SYST BP >= 140 MM HG: CPT | Performed by: STUDENT IN AN ORGANIZED HEALTH CARE EDUCATION/TRAINING PROGRAM

## 2025-08-27 PROCEDURE — G8417 CALC BMI ABV UP PARAM F/U: HCPCS | Performed by: STUDENT IN AN ORGANIZED HEALTH CARE EDUCATION/TRAINING PROGRAM

## 2025-08-27 PROCEDURE — 1159F MED LIST DOCD IN RCRD: CPT | Performed by: STUDENT IN AN ORGANIZED HEALTH CARE EDUCATION/TRAINING PROGRAM

## (undated) DEVICE — GLOVE ORANGE PI 7   MSG9070

## (undated) DEVICE — BANDAGE,GAUZE,4.5"X4.1YD,STERILE,LF: Brand: MEDLINE

## (undated) DEVICE — GLOVE SURG SZ 8 L11.77IN FNGR THK9.8MIL STRW LTX POLYMER

## (undated) DEVICE — PACK PROCEDURE SURG PLAS SC MIN SRHP LF

## (undated) DEVICE — COTTON BALL ST

## (undated) DEVICE — SUTURE MCRYL SZ 4-0 L18IN ABSRB UD P-3 L13MM 3/8 CIR PRIM Y494G